# Patient Record
Sex: FEMALE | Race: WHITE | NOT HISPANIC OR LATINO | Employment: FULL TIME | URBAN - METROPOLITAN AREA
[De-identification: names, ages, dates, MRNs, and addresses within clinical notes are randomized per-mention and may not be internally consistent; named-entity substitution may affect disease eponyms.]

---

## 2017-09-28 ENCOUNTER — PROCEDURE VISIT (OUTPATIENT)
Dept: OBSTETRICS AND GYNECOLOGY | Facility: CLINIC | Age: 22
End: 2017-09-28
Payer: MEDICAID

## 2017-09-28 ENCOUNTER — INITIAL PRENATAL (OUTPATIENT)
Dept: OBSTETRICS AND GYNECOLOGY | Facility: CLINIC | Age: 22
End: 2017-09-28
Payer: MEDICAID

## 2017-09-28 VITALS — WEIGHT: 139.31 LBS | DIASTOLIC BLOOD PRESSURE: 67 MMHG | SYSTOLIC BLOOD PRESSURE: 104 MMHG

## 2017-09-28 DIAGNOSIS — O09.891 HISTORY OF PRETERM DELIVERY, CURRENTLY PREGNANT IN FIRST TRIMESTER: ICD-10-CM

## 2017-09-28 DIAGNOSIS — Z32.01 POSITIVE PREGNANCY TEST: Primary | ICD-10-CM

## 2017-09-28 DIAGNOSIS — Z32.01 POSITIVE PREGNANCY TEST: ICD-10-CM

## 2017-09-28 PROCEDURE — 76801 OB US < 14 WKS SINGLE FETUS: CPT | Mod: PBBFAC,PN | Performed by: OBSTETRICS & GYNECOLOGY

## 2017-09-28 PROCEDURE — 76801 OB US < 14 WKS SINGLE FETUS: CPT | Mod: 26,S$PBB,, | Performed by: OBSTETRICS & GYNECOLOGY

## 2017-09-28 PROCEDURE — 99999 PR PBB SHADOW E&M-NEW PATIENT-LVL III: CPT | Mod: PBBFAC,,, | Performed by: OBSTETRICS & GYNECOLOGY

## 2017-09-28 PROCEDURE — 99203 OFFICE O/P NEW LOW 30 MIN: CPT | Mod: PBBFAC,25,PN | Performed by: OBSTETRICS & GYNECOLOGY

## 2017-09-28 PROCEDURE — 99203 OFFICE O/P NEW LOW 30 MIN: CPT | Mod: TH,S$PBB,, | Performed by: OBSTETRICS & GYNECOLOGY

## 2017-09-28 NOTE — PROGRESS NOTES
Patient welcomed into collaborative MD/CNM practice!  PE WNL  UPT positive  No bleeding or cramping since LMP  Hx of previous  births x2 @ 33 and 35 weeks, spontaneous labor  Discussed benefits of BF, rooming / and skin to skin  US for dating today, 6w4d by LMP of 2017  Rx sent for PNV to preferred pharmacy  States is prone to headaches, but didn't know which medication was safe in pregnancy. Will take Tylenol, if not controlled will call office.   Experiencing mild nausea, denies need for medication @ this time. Will call if needed  GC/Chlamydia and urine culture next OV  RTC in 4 weeks  AAdithya LYNN

## 2017-09-29 ENCOUNTER — TELEPHONE (OUTPATIENT)
Dept: OBSTETRICS AND GYNECOLOGY | Facility: CLINIC | Age: 22
End: 2017-09-29

## 2017-09-29 NOTE — TELEPHONE ENCOUNTER
----- Message from Kay Messina sent at 9/28/2017  6:14 PM CDT -----  Contact: Sheron mccoy/[facility]   Sheron is calling in regards to a prenatal A-D that was prescribed for pt. Per Sheron looks like it can't be ordered. Calling to see if there is a different prenatal vitamin pt can get.    Sheron can be reached at [phone].    Thank you

## 2017-10-03 ENCOUNTER — TELEPHONE (OUTPATIENT)
Dept: OBSTETRICS AND GYNECOLOGY | Facility: CLINIC | Age: 22
End: 2017-10-03

## 2017-10-03 PROBLEM — O09.891 HISTORY OF PRETERM DELIVERY, CURRENTLY PREGNANT IN FIRST TRIMESTER: Status: ACTIVE | Noted: 2017-10-03

## 2017-10-03 NOTE — TELEPHONE ENCOUNTER
----- Message from Tae M Jasen sent at 10/3/2017 12:03 PM CDT -----  Contact: Lab Client Services  Hi my name is Tae I work in the lab Client Services. We had a problem with some lab work on this patient. If someone from your office could call us at [phone] or [phone] that would be great. Anyone in my department can help. Thank You

## 2017-10-12 ENCOUNTER — TELEPHONE (OUTPATIENT)
Dept: OBSTETRICS AND GYNECOLOGY | Facility: CLINIC | Age: 22
End: 2017-10-12

## 2017-10-12 NOTE — TELEPHONE ENCOUNTER
Pt called c/o cramping in pregnancy. I asked her if she was having any bleeding she stated that she wasn't. I asked her if her cramping increases along with bleeding to please go to the ED. She was advised to increase her water intake, try warm soaks in the tub, and tylenol as needed. DS

## 2017-10-23 ENCOUNTER — LAB VISIT (OUTPATIENT)
Dept: LAB | Facility: HOSPITAL | Age: 22
End: 2017-10-23
Attending: OBSTETRICS & GYNECOLOGY
Payer: MEDICAID

## 2017-10-23 DIAGNOSIS — Z32.01 POSITIVE PREGNANCY TEST: ICD-10-CM

## 2017-10-23 LAB
ABO + RH BLD: NORMAL
BASOPHILS # BLD AUTO: 0.03 K/UL
BASOPHILS NFR BLD: 0.5 %
BLD GP AB SCN CELLS X3 SERPL QL: NORMAL
DIFFERENTIAL METHOD: ABNORMAL
EOSINOPHIL # BLD AUTO: 0.1 K/UL
EOSINOPHIL NFR BLD: 1 %
ERYTHROCYTE [DISTWIDTH] IN BLOOD BY AUTOMATED COUNT: 13.3 %
HCT VFR BLD AUTO: 32.2 %
HGB BLD-MCNC: 10.3 G/DL
HGB S BLD QL SOLY: NEGATIVE
IMM GRANULOCYTES # BLD AUTO: 0.01 K/UL
IMM GRANULOCYTES NFR BLD AUTO: 0.2 %
LYMPHOCYTES # BLD AUTO: 1.4 K/UL
LYMPHOCYTES NFR BLD: 23.3 %
MCH RBC QN AUTO: 28.2 PG
MCHC RBC AUTO-ENTMCNC: 32 G/DL
MCV RBC AUTO: 88 FL
MONOCYTES # BLD AUTO: 0.3 K/UL
MONOCYTES NFR BLD: 5.3 %
NEUTROPHILS # BLD AUTO: 4.2 K/UL
NEUTROPHILS NFR BLD: 69.7 %
NRBC BLD-RTO: 0 /100 WBC
PLATELET # BLD AUTO: 274 K/UL
PMV BLD AUTO: 11 FL
RBC # BLD AUTO: 3.65 M/UL
WBC # BLD AUTO: 6.02 K/UL

## 2017-10-23 PROCEDURE — 86850 RBC ANTIBODY SCREEN: CPT

## 2017-10-23 PROCEDURE — 86762 RUBELLA ANTIBODY: CPT

## 2017-10-23 PROCEDURE — 86592 SYPHILIS TEST NON-TREP QUAL: CPT

## 2017-10-23 PROCEDURE — 85025 COMPLETE CBC W/AUTO DIFF WBC: CPT

## 2017-10-23 PROCEDURE — 86703 HIV-1/HIV-2 1 RESULT ANTBDY: CPT

## 2017-10-23 PROCEDURE — 86900 BLOOD TYPING SEROLOGIC ABO: CPT

## 2017-10-23 PROCEDURE — 36415 COLL VENOUS BLD VENIPUNCTURE: CPT | Mod: PO

## 2017-10-23 PROCEDURE — 85660 RBC SICKLE CELL TEST: CPT

## 2017-10-23 PROCEDURE — 87340 HEPATITIS B SURFACE AG IA: CPT

## 2017-10-24 LAB
HBV SURFACE AG SERPL QL IA: NEGATIVE
HIV 1+2 AB+HIV1 P24 AG SERPL QL IA: NEGATIVE
RPR SER QL: NORMAL
RUBV IGG SER-ACNC: <5 IU/ML
RUBV IGG SER-IMP: ABNORMAL

## 2017-10-26 ENCOUNTER — TELEPHONE (OUTPATIENT)
Dept: OBSTETRICS AND GYNECOLOGY | Facility: CLINIC | Age: 22
End: 2017-10-26

## 2017-10-26 ENCOUNTER — ROUTINE PRENATAL (OUTPATIENT)
Dept: OBSTETRICS AND GYNECOLOGY | Facility: CLINIC | Age: 22
End: 2017-10-26
Payer: MEDICAID

## 2017-10-26 VITALS — DIASTOLIC BLOOD PRESSURE: 76 MMHG | SYSTOLIC BLOOD PRESSURE: 118 MMHG | WEIGHT: 138.88 LBS

## 2017-10-26 DIAGNOSIS — O09.891 HISTORY OF PRETERM DELIVERY, CURRENTLY PREGNANT IN FIRST TRIMESTER: Primary | ICD-10-CM

## 2017-10-26 DIAGNOSIS — Z11.3 SCREENING FOR GONORRHEA: ICD-10-CM

## 2017-10-26 DIAGNOSIS — Z12.4 SCREENING FOR CERVICAL CANCER: ICD-10-CM

## 2017-10-26 PROCEDURE — 88141 CYTOPATH C/V INTERPRET: CPT | Mod: ,,, | Performed by: PATHOLOGY

## 2017-10-26 PROCEDURE — 87086 URINE CULTURE/COLONY COUNT: CPT

## 2017-10-26 PROCEDURE — 87624 HPV HI-RISK TYP POOLED RSLT: CPT

## 2017-10-26 PROCEDURE — 99213 OFFICE O/P EST LOW 20 MIN: CPT | Mod: PBBFAC,PN | Performed by: OBSTETRICS & GYNECOLOGY

## 2017-10-26 PROCEDURE — 88175 CYTOPATH C/V AUTO FLUID REDO: CPT | Performed by: PATHOLOGY

## 2017-10-26 PROCEDURE — 99213 OFFICE O/P EST LOW 20 MIN: CPT | Mod: TH,S$PBB,, | Performed by: OBSTETRICS & GYNECOLOGY

## 2017-10-26 PROCEDURE — 87591 N.GONORRHOEAE DNA AMP PROB: CPT

## 2017-10-26 PROCEDURE — 99999 PR PBB SHADOW E&M-EST. PATIENT-LVL III: CPT | Mod: PBBFAC,,, | Performed by: OBSTETRICS & GYNECOLOGY

## 2017-10-26 NOTE — PROGRESS NOTES
No further having contractions  Having problems getting used to pregnancy breathing  Pap/gc/ct today  ucx today  Reviewed edc, prenatal labs  Anatomy/cervical length sono at 18w  Offer quad screen at 16w

## 2017-10-26 NOTE — TELEPHONE ENCOUNTER
----- Message from Katie Ferris MD sent at 10/26/2017  2:53 PM CDT -----  Please ck status of domingo from [facility]

## 2017-10-27 ENCOUNTER — TELEPHONE (OUTPATIENT)
Dept: PHARMACY | Facility: CLINIC | Age: 22
End: 2017-10-27

## 2017-10-27 LAB
BACTERIA UR CULT: NORMAL
C TRACH DNA SPEC QL NAA+PROBE: NOT DETECTED
N GONORRHOEA DNA SPEC QL NAA+PROBE: NOT DETECTED

## 2017-10-27 RX ORDER — HYDROXYPROGESTERONE CAPROATE 250 MG/ML
250 INJECTION INTRAMUSCULAR
Qty: 5 ML | Refills: 4 | Status: SHIPPED | OUTPATIENT
Start: 2017-10-27 | End: 2018-03-10

## 2017-10-27 NOTE — TELEPHONE ENCOUNTER
LVM- Hello [facility] received a prescription for [place] and we will contact their insurance company to find out if the medication is covered. We will update patient of status as more information is received. feel free to give us a call with  any questions at [phone].

## 2017-11-01 ENCOUNTER — TELEPHONE (OUTPATIENT)
Dept: OBSTETRICS AND GYNECOLOGY | Facility: CLINIC | Age: 22
End: 2017-11-01

## 2017-11-01 ENCOUNTER — OFFICE VISIT (OUTPATIENT)
Dept: URGENT CARE | Facility: CLINIC | Age: 22
End: 2017-11-01
Payer: MEDICAID

## 2017-11-01 VITALS
TEMPERATURE: 98 F | SYSTOLIC BLOOD PRESSURE: 118 MMHG | WEIGHT: 138.88 LBS | DIASTOLIC BLOOD PRESSURE: 57 MMHG | HEIGHT: 62 IN | HEART RATE: 97 BPM | OXYGEN SATURATION: 100 % | BODY MASS INDEX: 25.55 KG/M2

## 2017-11-01 DIAGNOSIS — J02.9 PHARYNGITIS, UNSPECIFIED ETIOLOGY: ICD-10-CM

## 2017-11-01 DIAGNOSIS — R68.89 FLU-LIKE SYMPTOMS: Primary | ICD-10-CM

## 2017-11-01 LAB
CTP QC/QA: YES
CTP QC/QA: YES
FLUAV AG NPH QL: NEGATIVE
FLUBV AG NPH QL: NEGATIVE
S PYO RRNA THROAT QL PROBE: NEGATIVE

## 2017-11-01 PROCEDURE — 87880 STREP A ASSAY W/OPTIC: CPT | Mod: PBBFAC,PN | Performed by: NURSE PRACTITIONER

## 2017-11-01 PROCEDURE — 99214 OFFICE O/P EST MOD 30 MIN: CPT | Mod: PBBFAC,PN | Performed by: NURSE PRACTITIONER

## 2017-11-01 PROCEDURE — 87804 INFLUENZA ASSAY W/OPTIC: CPT | Mod: 59,PBBFAC,PN | Performed by: NURSE PRACTITIONER

## 2017-11-01 PROCEDURE — 87081 CULTURE SCREEN ONLY: CPT

## 2017-11-01 PROCEDURE — 99203 OFFICE O/P NEW LOW 30 MIN: CPT | Mod: S$PBB,,, | Performed by: NURSE PRACTITIONER

## 2017-11-01 PROCEDURE — 99999 PR PBB SHADOW E&M-EST. PATIENT-LVL IV: CPT | Mod: PBBFAC,,, | Performed by: NURSE PRACTITIONER

## 2017-11-01 RX ORDER — OSELTAMIVIR PHOSPHATE 75 MG/1
75 CAPSULE ORAL 2 TIMES DAILY
Qty: 10 CAPSULE | Refills: 0 | Status: SHIPPED | OUTPATIENT
Start: 2017-11-01 | End: 2017-11-06

## 2017-11-01 NOTE — PROGRESS NOTES
Subjective:       Patient ID: Mattie Bledsoe is a 22 y.o. female.    Chief Complaint: Fever; Sore Throat; and Generalized Body Aches    22 year old presents to Urgent Care with reports of flu-like symptoms with sore throat that started today. Denies any other problems or concerns at this time.       Influenza   This is a new problem. The current episode started today. The problem occurs constantly. The problem has been unchanged. Associated symptoms include a fever and a sore throat. Pertinent negatives include no abdominal pain, chest pain, chills, nausea, numbness, rash, vomiting or weakness. Associated symptoms comments: Body aches. Nothing aggravates the symptoms. She has tried nothing for the symptoms. The treatment provided no relief.     Review of Systems   Constitutional: Positive for fever. Negative for appetite change and chills.   HENT: Positive for sore throat. Negative for ear pain, sinus pressure and trouble swallowing.    Eyes: Negative for visual disturbance.   Respiratory: Negative for shortness of breath.    Cardiovascular: Negative for chest pain.   Gastrointestinal: Negative for abdominal pain, diarrhea, nausea and vomiting.   Endocrine: Negative for cold intolerance, polyphagia and polyuria.   Genitourinary: Negative for decreased urine volume and dysuria.   Musculoskeletal: Negative for back pain.   Skin: Negative for rash.   Allergic/Immunologic: Negative for environmental allergies and food allergies.   Neurological: Negative for dizziness, tremors, weakness and numbness.   Hematological: Does not bruise/bleed easily.   Psychiatric/Behavioral: Negative for confusion and hallucinations. The patient is not nervous/anxious and is not hyperactive.    All other systems reviewed and are negative.      Objective:     Physical Exam   Constitutional: She is oriented to person, place, and time. She appears well-developed and well-nourished.   HENT:   Head: Normocephalic and atraumatic.   Right Ear:  External ear normal.   Left Ear: External ear normal.   Nose: Mucosal edema present.   Mouth/Throat: Oropharynx is clear and moist.   Eyes: Conjunctivae and EOM are normal. Pupils are equal, round, and reactive to light.   Neck: Normal range of motion. Neck supple.   Cardiovascular: Normal rate, regular rhythm, normal heart sounds and intact distal pulses.    No murmur heard.  Pulmonary/Chest: Effort normal and breath sounds normal. She has no wheezes.   Abdominal: Soft. Bowel sounds are normal. There is no tenderness.   Musculoskeletal: Normal range of motion.   Neurological: She is alert and oriented to person, place, and time. She has normal reflexes.   Skin: Skin is warm and dry. No rash noted.   Psychiatric: She has a normal mood and affect. Her behavior is normal. Judgment and thought content normal.   Nursing note and vitals reviewed.  POCT Strep and FLU resulted negative  Recommended follow up within 3-5 days or sooner if needed with PCP.  Assessment:     1. Flu-like symptoms    2. Pharyngitis, unspecified etiology      Plan:   Mattie was seen today for fever, sore throat and generalized body aches.    Diagnoses and all orders for this visit:    Flu-like symptoms  -     POCT Influenza A/B    Pharyngitis, unspecified etiology  -     POCT Rapid Strep A  -     Strep A culture, throat    Other orders  -     oseltamivir (TAMIFLU) 75 MG capsule; Take 1 capsule (75 mg total) by mouth 2 (two) times daily.

## 2017-11-01 NOTE — PATIENT INSTRUCTIONS

## 2017-11-01 NOTE — LETTER
November 1, 2017      [facility]  [address]  [place] [address]  Phone: [phone]       Patient: Mattie Bledsoe   YOB: 1995  Date of Visit: 11/01/2017    To Whom It May Concern:    Andrez Bledsoe  was at [facility] on 11/01/2017. She may return to work/school on 11/4/2017 with no restrictions. If you have any questions or concerns, or if I can be of further assistance, please do not hesitate to contact me.    Sincerely,      ISIDRO Levy

## 2017-11-01 NOTE — TELEPHONE ENCOUNTER
----- Message from Jane Villanueva sent at 11/1/2017 11:48 AM CDT -----  12 wks, has cough & fever..what should she take....[phone] (home)

## 2017-11-06 LAB — BACTERIA THROAT CULT: NORMAL

## 2017-11-10 LAB
HPV16 AG SPEC QL: NEGATIVE
HPV16+18+H RISK 12 DNA CVX-IMP: POSITIVE
HPV18 DNA SPEC QL NAA+PROBE: NEGATIVE

## 2017-11-27 ENCOUNTER — ROUTINE PRENATAL (OUTPATIENT)
Dept: OBSTETRICS AND GYNECOLOGY | Facility: CLINIC | Age: 22
End: 2017-11-27
Payer: MEDICAID

## 2017-11-27 VITALS — SYSTOLIC BLOOD PRESSURE: 118 MMHG | BODY MASS INDEX: 25 KG/M2 | WEIGHT: 136.69 LBS | DIASTOLIC BLOOD PRESSURE: 67 MMHG

## 2017-11-27 DIAGNOSIS — Z28.39 RUBELLA NON-IMMUNE STATUS, ANTEPARTUM: ICD-10-CM

## 2017-11-27 DIAGNOSIS — B97.7 HIGH RISK HPV INFECTION: ICD-10-CM

## 2017-11-27 DIAGNOSIS — D64.9 ANEMIA, UNSPECIFIED TYPE: ICD-10-CM

## 2017-11-27 DIAGNOSIS — O09.899 HISTORY OF PRETERM DELIVERY, CURRENTLY PREGNANT: Primary | ICD-10-CM

## 2017-11-27 DIAGNOSIS — O09.899 RUBELLA NON-IMMUNE STATUS, ANTEPARTUM: ICD-10-CM

## 2017-11-27 DIAGNOSIS — Z3A.15 15 WEEKS GESTATION OF PREGNANCY: ICD-10-CM

## 2017-11-27 PROCEDURE — 99999 PR PBB SHADOW E&M-EST. PATIENT-LVL III: CPT | Mod: PBBFAC,,, | Performed by: OBSTETRICS & GYNECOLOGY

## 2017-11-27 PROCEDURE — 99213 OFFICE O/P EST LOW 20 MIN: CPT | Mod: PBBFAC,PN | Performed by: OBSTETRICS & GYNECOLOGY

## 2017-11-27 PROCEDURE — 99213 OFFICE O/P EST LOW 20 MIN: CPT | Mod: TH,S$PBB,, | Performed by: OBSTETRICS & GYNECOLOGY

## 2017-11-27 RX ORDER — HYDROXYPROGESTERONE CAPROATE 250 MG/ML
250 INJECTION INTRAMUSCULAR
Qty: 1 ML | Refills: 7 | Status: SHIPPED | OUTPATIENT
Start: 2017-11-27 | End: 2018-01-23 | Stop reason: SDUPTHER

## 2017-11-27 NOTE — PROGRESS NOTES
Loss of 1 1/2 #, but minimal n/v.  Reviewed all labs.  + Hi - risk HPV. Discussed findings, care. Reassured does not have cervical cancer at this time, but will need a colpo for evaluation. Will do after 18-20 wks.   Will do anatomy scan and cervical length in 4 wks.   Plan for start of Kiara 250 mg IM at next ov and then continue weekly.   Taking PNV daily and focusing on protein rich foods. Does not like red meat.  Recommended Floradix OTC as directed.   Quickening noted.  No spotting or cramping.   RTC 4 wks.

## 2017-11-28 NOTE — TELEPHONE ENCOUNTER
Left message for patient that I have put her on nurse visit schedule in [place] on Monday, December 4 and to call if this was not convenient [phone].

## 2017-11-28 NOTE — TELEPHONE ENCOUNTER
Left message for Mattie Rakan informing her [place] will be delivered to office this week with plan to start her weekly injections on Monday 12/3/2017.    If this day doesn't work told her to call me back to set up nurse visit on day she desires.  But for now- tentatively place her in nurse visit slot for Monday to start her domingo injections weekly.    Thanks.  She may be calling back to schedule another day for next week .

## 2017-12-04 ENCOUNTER — CLINICAL SUPPORT (OUTPATIENT)
Dept: OBSTETRICS AND GYNECOLOGY | Facility: CLINIC | Age: 22
End: 2017-12-04
Payer: MEDICAID

## 2017-12-04 VITALS — SYSTOLIC BLOOD PRESSURE: 95 MMHG | HEIGHT: 62 IN | DIASTOLIC BLOOD PRESSURE: 57 MMHG

## 2017-12-04 DIAGNOSIS — O09.899 HISTORY OF PRETERM DELIVERY, CURRENTLY PREGNANT: Primary | ICD-10-CM

## 2017-12-04 PROCEDURE — 99212 OFFICE O/P EST SF 10 MIN: CPT | Mod: PBBFAC,PN

## 2017-12-04 PROCEDURE — 99999 PR PBB SHADOW E&M-EST. PATIENT-LVL II: CPT | Mod: PBBFAC,,,

## 2017-12-04 RX ORDER — HYDROXYPROGESTERONE CAPROATE 250 MG/ML
250 INJECTION INTRAMUSCULAR
Status: COMPLETED | OUTPATIENT
Start: 2017-12-04 | End: 2017-12-04

## 2017-12-04 RX ADMIN — HYDROXYPROGESTERONE CAPROATE 250 MG: 250 INJECTION INTRAMUSCULAR at 01:12

## 2017-12-04 NOTE — PROGRESS NOTES
Per [place] protocol, gave pt. Kiara injection to right ventro gluteal area. Pt. advised to remain in the waiting area for 15 minutes to ensure no adverse reaction. Pt. given future depo dates. ssmith,lpn

## 2017-12-11 ENCOUNTER — TELEPHONE (OUTPATIENT)
Dept: OBSTETRICS AND GYNECOLOGY | Facility: CLINIC | Age: 22
End: 2017-12-11

## 2017-12-11 ENCOUNTER — CLINICAL SUPPORT (OUTPATIENT)
Dept: OBSTETRICS AND GYNECOLOGY | Facility: CLINIC | Age: 22
End: 2017-12-11
Payer: MEDICAID

## 2017-12-11 VITALS — SYSTOLIC BLOOD PRESSURE: 128 MMHG | DIASTOLIC BLOOD PRESSURE: 76 MMHG

## 2017-12-11 DIAGNOSIS — O09.899 HISTORY OF PRETERM DELIVERY, CURRENTLY PREGNANT: Primary | ICD-10-CM

## 2017-12-11 PROCEDURE — 99999 PR PBB SHADOW E&M-EST. PATIENT-LVL II: CPT | Mod: PBBFAC,,,

## 2017-12-11 PROCEDURE — 99212 OFFICE O/P EST SF 10 MIN: CPT | Mod: PBBFAC,PN

## 2017-12-11 RX ORDER — HYDROXYPROGESTERONE CAPROATE 250 MG/ML
250 INJECTION INTRAMUSCULAR
Status: DISCONTINUED | OUTPATIENT
Start: 2017-12-11 | End: 2018-04-12

## 2017-12-11 RX ADMIN — HYDROXYPROGESTERONE CAPROATE 250 MG: 250 INJECTION INTRAMUSCULAR at 10:12

## 2017-12-11 NOTE — TELEPHONE ENCOUNTER
Called pt. back, verified name & . Was discussing depo med she was receiving, educating on breakthrough bleeding and side effects and and while dispensing she said wait it's should be Kiara. Stopped giving med and asked Dr. vidal to come and talk to the pt. Dr. Vidal said to give the pt. her [place] injection as well. Apologized to pt. and gave her service recovery gift. Explained the situation to her.  I called Maggi Garduno (Supervisor) and filled out an SOS. tawana Chavez

## 2017-12-11 NOTE — PROGRESS NOTES
.Per [place]ara protocol, gave pt. [place] injection to right deltoid area. Pt. advised top remain in the waiting area for 15 minutes to ensure no adverse reaction. Pt. given future [place] dates. tawana kauffman

## 2017-12-12 ENCOUNTER — TELEPHONE (OUTPATIENT)
Dept: OBSTETRICS AND GYNECOLOGY | Facility: CLINIC | Age: 22
End: 2017-12-12

## 2017-12-12 NOTE — TELEPHONE ENCOUNTER
Please refer to encounter dated 10/30/17; where her domingo has been approved; and was to shipped to office 12/1/17

## 2017-12-12 NOTE — TELEPHONE ENCOUNTER
Updated pt. (Injection SOS incident) on 17. Pt. Came to the clinic for a [place] injection.  I called the pt. back from the waiting room area and verified her name and . I started conversing with the pt. About her receiving the medroxyprogesterone injectable 150mg/ml injection. On my nurses schedule, I had the pt. Appointment listed as a depo injection but it should have been a Kiara injection.  Dr. Ferris was not at the clinic at the time.  I went to the waiting area asked the pt. 2 times to wait a little longer but I felt bad because her baby was sick. I offered her to have her child seen at  Urgent Care but she declined.  I pended the medroxyprogesterone injectable 150mg/ml injection to Dr. Ferris and went into the room with the patient. I started talking to the pt. and asking her if she was aware of the side effects of depo injection like breakthrough bleeding.  She acknowledged that she understood and I was telling her that she has to return in three months for her future visit.  I was starting to inject the depo injection into her left deltoid area which she requested, and as I was started to put the syringe in she said, you know what, it's suppose to be Kiara.  I immediately pulled the needle out (which means the pt. received about 0.5mls of the medroxyprogesterone injectable 150mg/ml.  I immediately asked the pt. To please wait and I left out of the room and went to get Dr. Ferris.  I told Dr. Ferris that I had just given the wrong medication and she said she would go talk to the patient.  I went in after Dr. Ferris left out of the exam room and I apologized and gave the pt. A Kiara injection that Dr. Ferris put in the system.  I also gave the pt. service recovery gifts.   I then called Maggi Garduno (Supervisor) and she told me to put in an SOS and chart what happened.  I put in an SOS and charted what happened.  I met with Quality and Pharmacy and did some very thorough walk thru and discussed how  we can better our process.  tawana kauffman

## 2017-12-12 NOTE — PROGRESS NOTES
Pt advised that she had been given depo provera injection in error.  Advised patient that this will medication will not effect the pregnancy.  Pt advised she also still needs her domingo injection.  Pt voiced understanding.

## 2017-12-12 NOTE — TELEPHONE ENCOUNTER
Dr. Ferris can you please put in an order for Kiara for [facility].  I contacted the [facility] pharmacy [phone] and they have no record of the pt.'s order.  Thank you. tawana kauffman

## 2017-12-18 ENCOUNTER — CLINICAL SUPPORT (OUTPATIENT)
Dept: OBSTETRICS AND GYNECOLOGY | Facility: CLINIC | Age: 22
End: 2017-12-18
Payer: MEDICAID

## 2017-12-18 DIAGNOSIS — O09.899 HISTORY OF PRETERM DELIVERY, CURRENTLY PREGNANT: Primary | ICD-10-CM

## 2017-12-18 RX ADMIN — HYDROXYPROGESTERONE CAPROATE 250 MG: 250 INJECTION INTRAMUSCULAR at 09:12

## 2017-12-18 NOTE — PROGRESS NOTES
Per [place] protocol, gave pt. Kiara injection to right deltoid area. Pt. advised top remain in the waiting area for 15 minutes to ensure no adverse reaction. Pt. given future Kiara dates. tawana kauffman

## 2017-12-26 ENCOUNTER — ROUTINE PRENATAL (OUTPATIENT)
Dept: OBSTETRICS AND GYNECOLOGY | Facility: CLINIC | Age: 22
End: 2017-12-26
Payer: MEDICAID

## 2017-12-26 VITALS
WEIGHT: 139.25 LBS | BODY MASS INDEX: 25.46 KG/M2 | SYSTOLIC BLOOD PRESSURE: 107 MMHG | DIASTOLIC BLOOD PRESSURE: 66 MMHG

## 2017-12-26 DIAGNOSIS — Z87.51 HISTORY OF PRETERM LABOR: ICD-10-CM

## 2017-12-26 DIAGNOSIS — Z3A.19 19 WEEKS GESTATION OF PREGNANCY: Primary | ICD-10-CM

## 2017-12-26 PROCEDURE — 99213 OFFICE O/P EST LOW 20 MIN: CPT | Mod: PBBFAC,PN | Performed by: OBSTETRICS & GYNECOLOGY

## 2017-12-26 PROCEDURE — 99212 OFFICE O/P EST SF 10 MIN: CPT | Mod: TH,S$PBB,, | Performed by: OBSTETRICS & GYNECOLOGY

## 2017-12-26 PROCEDURE — 99999 PR PBB SHADOW E&M-EST. PATIENT-LVL III: CPT | Mod: PBBFAC,,, | Performed by: OBSTETRICS & GYNECOLOGY

## 2017-12-26 RX ADMIN — HYDROXYPROGESTERONE CAPROATE 250 MG: 250 INJECTION INTRAMUSCULAR at 03:12

## 2017-12-27 NOTE — PROGRESS NOTES
Weekly Kiara continues.  Much quickening noted.   Good fh/fm.   Common discomforts of pregnancy only.   Colpo needs to be scheduled.  Anatomy scan scheduled for Thursday.   Eating iron rich foods, not taking oral iron therapy.   Check cbc with next ov.   RTC 3 wks.

## 2018-01-02 ENCOUNTER — CLINICAL SUPPORT (OUTPATIENT)
Dept: OBSTETRICS AND GYNECOLOGY | Facility: CLINIC | Age: 23
End: 2018-01-02
Payer: MEDICAID

## 2018-01-02 DIAGNOSIS — Z87.51 HISTORY OF PRETERM LABOR: Primary | ICD-10-CM

## 2018-01-02 PROCEDURE — 99999 PR PBB SHADOW E&M-EST. PATIENT-LVL I: CPT | Mod: PBBFAC,,,

## 2018-01-02 PROCEDURE — 99211 OFF/OP EST MAY X REQ PHY/QHP: CPT | Mod: PBBFAC,PN,25

## 2018-01-02 PROCEDURE — 96372 THER/PROPH/DIAG INJ SC/IM: CPT | Mod: PBBFAC,PN

## 2018-01-02 RX ORDER — HYDROXYPROGESTERONE CAPROATE 250 MG/ML
250 INJECTION INTRAMUSCULAR
Status: COMPLETED | OUTPATIENT
Start: 2018-01-02 | End: 2018-01-02

## 2018-01-02 RX ADMIN — HYDROXYPROGESTERONE CAPROATE 250 MG: 250 INJECTION INTRAMUSCULAR at 10:01

## 2018-01-02 NOTE — PROGRESS NOTES
Per [place] protocol, gave pt. Kiara injection to right deltoid area. Pt. advised to remain in the waiting area for 15 minutes to ensure no adverse reaction. Pt. given future [place] dates. tawana kauffman

## 2018-01-08 ENCOUNTER — CLINICAL SUPPORT (OUTPATIENT)
Dept: OBSTETRICS AND GYNECOLOGY | Facility: CLINIC | Age: 23
End: 2018-01-08
Payer: MEDICAID

## 2018-01-08 ENCOUNTER — PROCEDURE VISIT (OUTPATIENT)
Dept: OBSTETRICS AND GYNECOLOGY | Facility: CLINIC | Age: 23
End: 2018-01-08
Payer: MEDICAID

## 2018-01-08 DIAGNOSIS — O09.899 HISTORY OF PRETERM DELIVERY, CURRENTLY PREGNANT: Primary | ICD-10-CM

## 2018-01-08 DIAGNOSIS — Z36.3 ENCOUNTER FOR ANTENATAL SCREENING FOR MALFORMATION USING ULTRASOUND: ICD-10-CM

## 2018-01-08 DIAGNOSIS — O09.899 HISTORY OF PRETERM DELIVERY, CURRENTLY PREGNANT: ICD-10-CM

## 2018-01-08 PROCEDURE — 96372 THER/PROPH/DIAG INJ SC/IM: CPT | Mod: PBBFAC,PN

## 2018-01-08 PROCEDURE — 76817 TRANSVAGINAL US OBSTETRIC: CPT | Mod: 52,PBBFAC,PN | Performed by: OBSTETRICS & GYNECOLOGY

## 2018-01-08 PROCEDURE — 99999 PR PBB SHADOW E&M-EST. PATIENT-LVL I: CPT | Mod: PBBFAC,,,

## 2018-01-08 PROCEDURE — 99211 OFF/OP EST MAY X REQ PHY/QHP: CPT | Mod: PBBFAC,PN

## 2018-01-08 PROCEDURE — 76805 OB US >/= 14 WKS SNGL FETUS: CPT | Mod: 26,S$PBB,, | Performed by: OBSTETRICS & GYNECOLOGY

## 2018-01-08 PROCEDURE — 76805 OB US >/= 14 WKS SNGL FETUS: CPT | Mod: PBBFAC,PN | Performed by: OBSTETRICS & GYNECOLOGY

## 2018-01-08 RX ORDER — HYDROXYPROGESTERONE CAPROATE 250 MG/ML
250 INJECTION INTRAMUSCULAR
Status: COMPLETED | OUTPATIENT
Start: 2018-01-09 | End: 2018-01-15

## 2018-01-08 RX ADMIN — HYDROXYPROGESTERONE CAPROATE 250 MG: 250 INJECTION INTRAMUSCULAR at 09:01

## 2018-01-08 NOTE — PROGRESS NOTES
Per [place] protocol, gave pt. Kiara injection to right deltoid area. Pt. advised top remain in the waiting area for 15 minutes to ensure no adverse reaction. Pt. given future Kiara dates. tawana kauffman

## 2018-01-15 ENCOUNTER — CLINICAL SUPPORT (OUTPATIENT)
Dept: OBSTETRICS AND GYNECOLOGY | Facility: CLINIC | Age: 23
End: 2018-01-15
Payer: MEDICAID

## 2018-01-15 DIAGNOSIS — Z87.51 HISTORY OF PRETERM LABOR: Primary | ICD-10-CM

## 2018-01-15 PROCEDURE — 99211 OFF/OP EST MAY X REQ PHY/QHP: CPT | Mod: PBBFAC,PN,25

## 2018-01-15 PROCEDURE — 99999 PR PBB SHADOW E&M-EST. PATIENT-LVL I: CPT | Mod: PBBFAC,,,

## 2018-01-15 PROCEDURE — 96372 THER/PROPH/DIAG INJ SC/IM: CPT | Mod: PBBFAC,PN

## 2018-01-15 RX ORDER — HYDROXYPROGESTERONE CAPROATE 250 MG/ML
250 INJECTION INTRAMUSCULAR
Status: COMPLETED | OUTPATIENT
Start: 2018-01-16 | End: 2018-01-23

## 2018-01-15 RX ADMIN — HYDROXYPROGESTERONE CAPROATE 250 MG: 250 INJECTION INTRAMUSCULAR at 04:01

## 2018-01-15 NOTE — PROGRESS NOTES
Per [place] protocol, gave pt. Kiara injection to right deltoid area. Pt. advised to remain in the waiting area for 15 minutes to ensure no adverse reaction. Pt. given future [place] dates. tawana kauffman

## 2018-01-23 ENCOUNTER — LAB VISIT (OUTPATIENT)
Dept: LAB | Facility: HOSPITAL | Age: 23
End: 2018-01-23
Attending: OBSTETRICS & GYNECOLOGY
Payer: MEDICAID

## 2018-01-23 ENCOUNTER — ROUTINE PRENATAL (OUTPATIENT)
Dept: OBSTETRICS AND GYNECOLOGY | Facility: CLINIC | Age: 23
End: 2018-01-23
Payer: MEDICAID

## 2018-01-23 VITALS — SYSTOLIC BLOOD PRESSURE: 110 MMHG | DIASTOLIC BLOOD PRESSURE: 70 MMHG | WEIGHT: 147.63 LBS | BODY MASS INDEX: 27 KG/M2

## 2018-01-23 DIAGNOSIS — D64.9 ANEMIA, UNSPECIFIED TYPE: ICD-10-CM

## 2018-01-23 DIAGNOSIS — O26.872 CERVICAL SHORTENING IN SECOND TRIMESTER: ICD-10-CM

## 2018-01-23 DIAGNOSIS — Z3A.23 23 WEEKS GESTATION OF PREGNANCY: Primary | ICD-10-CM

## 2018-01-23 DIAGNOSIS — B97.7 HIGH RISK HPV INFECTION: ICD-10-CM

## 2018-01-23 LAB
BASOPHILS # BLD AUTO: 0.05 K/UL
BASOPHILS NFR BLD: 0.5 %
DIFFERENTIAL METHOD: ABNORMAL
EOSINOPHIL # BLD AUTO: 0.3 K/UL
EOSINOPHIL NFR BLD: 2.9 %
ERYTHROCYTE [DISTWIDTH] IN BLOOD BY AUTOMATED COUNT: 13.6 %
HCT VFR BLD AUTO: 29.6 %
HGB BLD-MCNC: 9.5 G/DL
IMM GRANULOCYTES # BLD AUTO: 0.03 K/UL
IMM GRANULOCYTES NFR BLD AUTO: 0.3 %
LYMPHOCYTES # BLD AUTO: 1.9 K/UL
LYMPHOCYTES NFR BLD: 19.2 %
MCH RBC QN AUTO: 29.2 PG
MCHC RBC AUTO-ENTMCNC: 32.1 G/DL
MCV RBC AUTO: 91 FL
MONOCYTES # BLD AUTO: 0.7 K/UL
MONOCYTES NFR BLD: 6.8 %
NEUTROPHILS # BLD AUTO: 6.8 K/UL
NEUTROPHILS NFR BLD: 70.3 %
NRBC BLD-RTO: 0 /100 WBC
PLATELET # BLD AUTO: 228 K/UL
PMV BLD AUTO: 10.6 FL
RBC # BLD AUTO: 3.25 M/UL
WBC # BLD AUTO: 9.7 K/UL

## 2018-01-23 PROCEDURE — 85025 COMPLETE CBC W/AUTO DIFF WBC: CPT

## 2018-01-23 PROCEDURE — 99213 OFFICE O/P EST LOW 20 MIN: CPT | Mod: PBBFAC,TH,PN | Performed by: OBSTETRICS & GYNECOLOGY

## 2018-01-23 PROCEDURE — 99212 OFFICE O/P EST SF 10 MIN: CPT | Mod: TH,S$PBB,, | Performed by: OBSTETRICS & GYNECOLOGY

## 2018-01-23 PROCEDURE — 99999 PR PBB SHADOW E&M-EST. PATIENT-LVL III: CPT | Mod: PBBFAC,,, | Performed by: OBSTETRICS & GYNECOLOGY

## 2018-01-23 PROCEDURE — 36415 COLL VENOUS BLD VENIPUNCTURE: CPT | Mod: PO

## 2018-01-23 RX ADMIN — HYDROXYPROGESTERONE CAPROATE 250 MG: 250 INJECTION INTRAMUSCULAR at 09:01

## 2018-01-23 NOTE — PROGRESS NOTES
Per Mirena protocol, gave pt. Mirena injection to left deltoid area. Pt. advised top remain in the waiting area for 15 minutes to ensure no adverse reaction. Pt. given future Mirena dates. tawana kauffman

## 2018-01-23 NOTE — PROGRESS NOTES
"Continues weekly [place].  Has some occasional tightening " a couple of times a week".  Baby active with good fundal height.   Wt gain appropriate.  Taking iron off and on. Understands anemia can contribute to increased risk for anemia. Take as directed.  Will check CBC today and adjust dose if needed.  No ve indicated.   ASCUS Pap- High risk HPV +. Consulted MD re need for Colpo now or pp in light of history. Waiting till PP  No complaints.   RTC for OV 2 wks., weekly for [place].     "

## 2018-01-25 ENCOUNTER — TELEPHONE (OUTPATIENT)
Dept: OBSTETRICS AND GYNECOLOGY | Facility: CLINIC | Age: 23
End: 2018-01-25

## 2018-01-25 ENCOUNTER — PATIENT MESSAGE (OUTPATIENT)
Dept: OBSTETRICS AND GYNECOLOGY | Facility: CLINIC | Age: 23
End: 2018-01-25

## 2018-01-25 DIAGNOSIS — D64.9 ANEMIA, UNSPECIFIED TYPE: Primary | ICD-10-CM

## 2018-01-25 RX ORDER — FERROUS SULFATE 325(65) MG
325 TABLET ORAL 2 TIMES DAILY
Qty: 90 TABLET | Refills: 2 | Status: SHIPPED | OUTPATIENT
Start: 2018-01-25 | End: 2018-05-30

## 2018-01-25 NOTE — TELEPHONE ENCOUNTER
----- Message from Tiffanie Jim sent at 1/25/2018 12:01 PM CST -----  Contact: pt  Calling to reschedule nurse visit for injection and please advise [phone] (home)

## 2018-01-25 NOTE — TELEPHONE ENCOUNTER
----- Message from Nikolay Anguiano sent at 1/25/2018  9:40 AM CST -----  Contact: Pt   Pt called to schedule her nurse visit to Monday instead of Tuesday of next week if possible..[phone] (home)

## 2018-01-29 ENCOUNTER — CLINICAL SUPPORT (OUTPATIENT)
Dept: OBSTETRICS AND GYNECOLOGY | Facility: CLINIC | Age: 23
End: 2018-01-29
Payer: MEDICAID

## 2018-01-29 DIAGNOSIS — O09.899 HISTORY OF PRETERM DELIVERY, CURRENTLY PREGNANT: Primary | ICD-10-CM

## 2018-01-29 RX ADMIN — HYDROXYPROGESTERONE CAPROATE 250 MG: 250 INJECTION INTRAMUSCULAR at 09:01

## 2018-01-29 NOTE — PROGRESS NOTES
Per [place] protocol, gave pt. Kiara injection to left deltoid area. Pt. advised top remain in the waiting area for 15 minutes to ensure no adverse reaction. Pt. given future [place] dates. tawana kauffman

## 2018-02-05 ENCOUNTER — CLINICAL SUPPORT (OUTPATIENT)
Dept: OBSTETRICS AND GYNECOLOGY | Facility: CLINIC | Age: 23
End: 2018-02-05
Payer: MEDICAID

## 2018-02-05 DIAGNOSIS — O09.899 HISTORY OF PRETERM DELIVERY, CURRENTLY PREGNANT: Primary | ICD-10-CM

## 2018-02-05 PROCEDURE — 96372 THER/PROPH/DIAG INJ SC/IM: CPT | Mod: PBBFAC,PN

## 2018-02-05 RX ADMIN — HYDROXYPROGESTERONE CAPROATE 250 MG: 250 INJECTION INTRAMUSCULAR at 09:02

## 2018-02-05 NOTE — PROGRESS NOTES
Per [place] protocol, gave pt. Kiara injection to right deltoidarea. Pt. advised to remain in the waiting area for 15 minutes to ensure no adverse reaction. Pt. given future [place] dates. tawana kauffman

## 2018-02-06 ENCOUNTER — TELEPHONE (OUTPATIENT)
Dept: PHARMACY | Facility: CLINIC | Age: 23
End: 2018-02-06

## 2018-02-08 ENCOUNTER — ROUTINE PRENATAL (OUTPATIENT)
Dept: OBSTETRICS AND GYNECOLOGY | Facility: CLINIC | Age: 23
End: 2018-02-08
Payer: MEDICAID

## 2018-02-08 VITALS
DIASTOLIC BLOOD PRESSURE: 61 MMHG | SYSTOLIC BLOOD PRESSURE: 107 MMHG | BODY MASS INDEX: 27.26 KG/M2 | WEIGHT: 149.06 LBS

## 2018-02-08 DIAGNOSIS — Z3A.25 25 WEEKS GESTATION OF PREGNANCY: Primary | ICD-10-CM

## 2018-02-08 DIAGNOSIS — Z87.51 HISTORY OF PRETERM LABOR: ICD-10-CM

## 2018-02-08 DIAGNOSIS — O47.02 THREATENED PREMATURE LABOR IN SECOND TRIMESTER: ICD-10-CM

## 2018-02-08 LAB — FIBRONECTIN FETAL SPEC QL: NEGATIVE

## 2018-02-08 PROCEDURE — 99213 OFFICE O/P EST LOW 20 MIN: CPT | Mod: TH,S$PBB,, | Performed by: OBSTETRICS & GYNECOLOGY

## 2018-02-08 PROCEDURE — 3008F BODY MASS INDEX DOCD: CPT | Mod: ,,, | Performed by: OBSTETRICS & GYNECOLOGY

## 2018-02-08 PROCEDURE — 99999 PR PBB SHADOW E&M-EST. PATIENT-LVL III: CPT | Mod: PBBFAC,,, | Performed by: OBSTETRICS & GYNECOLOGY

## 2018-02-08 PROCEDURE — 99213 OFFICE O/P EST LOW 20 MIN: CPT | Mod: PBBFAC,TH,PN | Performed by: OBSTETRICS & GYNECOLOGY

## 2018-02-08 PROCEDURE — 82731 ASSAY OF FETAL FIBRONECTIN: CPT

## 2018-02-08 NOTE — LETTER
[facility]  [address] [place] [address] [address]  [place] [address]  Phone: [phone]       2018    To Whom It May Concern,    Please be advised that Ms. Bledsoe is currently under our care for her pregnancy.    She is 25 weeks 4 days gestation and experiencing significant cramping and dizziness.  She is receiving weekly injections to prevent her very real risk of  labor and delivery and for this reason she was seen in the clinic today.    I have asked her to be at rest for the next 2 weeks till she will be re-evaluated again as to whether it is appropriate for her to return to work.    She has numerous tests pending and we are sincerely hoping that she can carry her pregnancy closer to term and avoid a  birth.    She will seen again on 2018 for follow up and make a determination at that time if she will be able to work.    Kindest regards,    Felicita Luna, MSN, APRN-CNM

## 2018-02-09 ENCOUNTER — TELEPHONE (OUTPATIENT)
Dept: OBSTETRICS AND GYNECOLOGY | Facility: CLINIC | Age: 23
End: 2018-02-09

## 2018-02-09 NOTE — TELEPHONE ENCOUNTER
Pt. [place] is due to be delivered to the [place] location on 2/9/18. Mattie called and paid her $3.00 copay. tawana Chavez Ms. Mattie, we are trying to reach you to see if you are ready for your [place] refill.     Your Copay is $ 3     We need to know     1. Have you started any new meds or developed any side effects?   2. Have there been any dose changes to this medication?       Please also inform us of any updates to your medical history.     Thank you.   [facility]   [phone]

## 2018-02-12 ENCOUNTER — CLINICAL SUPPORT (OUTPATIENT)
Dept: OBSTETRICS AND GYNECOLOGY | Facility: CLINIC | Age: 23
End: 2018-02-12
Payer: MEDICAID

## 2018-02-12 DIAGNOSIS — Z87.51 HISTORY OF PRETERM LABOR: Primary | ICD-10-CM

## 2018-02-12 PROCEDURE — 96372 THER/PROPH/DIAG INJ SC/IM: CPT | Mod: PBBFAC,PN

## 2018-02-12 RX ADMIN — HYDROXYPROGESTERONE CAPROATE 250 MG: 250 INJECTION INTRAMUSCULAR at 09:02

## 2018-02-12 NOTE — PROGRESS NOTES
Per [place] protocol, gave pt. Kiara injection to left deltoid area. Pt. advised to remain in the waiting area for 15 minutes to ensure no adverse reaction. Pt. given future [place] dates. tawana kauffman

## 2018-02-19 ENCOUNTER — CLINICAL SUPPORT (OUTPATIENT)
Dept: OBSTETRICS AND GYNECOLOGY | Facility: CLINIC | Age: 23
End: 2018-02-19
Payer: MEDICAID

## 2018-02-19 DIAGNOSIS — Z87.51 HISTORY OF PRETERM LABOR: Primary | ICD-10-CM

## 2018-02-19 PROCEDURE — 96372 THER/PROPH/DIAG INJ SC/IM: CPT | Mod: PBBFAC,PN

## 2018-02-19 PROCEDURE — 99999 PR PBB SHADOW E&M-EST. PATIENT-LVL II: CPT | Mod: PBBFAC,,,

## 2018-02-19 PROCEDURE — 99212 OFFICE O/P EST SF 10 MIN: CPT | Mod: PBBFAC,PN

## 2018-02-19 RX ADMIN — HYDROXYPROGESTERONE CAPROATE 250 MG: 250 INJECTION INTRAMUSCULAR at 09:02

## 2018-02-19 NOTE — PROGRESS NOTES
Per [place] protocol, gave pt. Kiara injection to right deltoid area. Pt. advised to remain in the waiting area for 15 minutes to ensure no adverse reaction. Pt. given future [place] dates. tawana kauffman

## 2018-02-21 ENCOUNTER — TELEPHONE (OUTPATIENT)
Dept: OBSTETRICS AND GYNECOLOGY | Facility: CLINIC | Age: 23
End: 2018-02-21

## 2018-02-21 NOTE — TELEPHONE ENCOUNTER
Spoke to patient and she is scheduled to see Dr. Ferris Friday 2/23.  Patient verbalized understanding.

## 2018-02-21 NOTE — TELEPHONE ENCOUNTER
----- Message from Nikolay Anugiano sent at 2/21/2018  4:15 PM CST -----  Contact: Pt   Pt called to discuss she was told she needed to be seen 2 weeks from the last visit which 2 weeks would be tomorrow pt states she has not heard anything regarding the appointment..[phone] (home)

## 2018-02-23 ENCOUNTER — ROUTINE PRENATAL (OUTPATIENT)
Dept: OBSTETRICS AND GYNECOLOGY | Facility: CLINIC | Age: 23
End: 2018-02-23
Payer: MEDICAID

## 2018-02-23 VITALS — BODY MASS INDEX: 28 KG/M2 | SYSTOLIC BLOOD PRESSURE: 109 MMHG | DIASTOLIC BLOOD PRESSURE: 65 MMHG | WEIGHT: 153.13 LBS

## 2018-02-23 DIAGNOSIS — Z87.51 HISTORY OF PRETERM LABOR: Primary | ICD-10-CM

## 2018-02-23 DIAGNOSIS — Z34.82 PRENATAL CARE, SUBSEQUENT PREGNANCY, SECOND TRIMESTER: ICD-10-CM

## 2018-02-23 DIAGNOSIS — B96.89 BACTERIAL VAGINOSIS: ICD-10-CM

## 2018-02-23 DIAGNOSIS — N76.0 BACTERIAL VAGINOSIS: ICD-10-CM

## 2018-02-23 PROCEDURE — 99999 PR PBB SHADOW E&M-EST. PATIENT-LVL III: CPT | Mod: PBBFAC,,, | Performed by: OBSTETRICS & GYNECOLOGY

## 2018-02-23 PROCEDURE — 99213 OFFICE O/P EST LOW 20 MIN: CPT | Mod: PBBFAC,TH,PN | Performed by: OBSTETRICS & GYNECOLOGY

## 2018-02-23 PROCEDURE — 99213 OFFICE O/P EST LOW 20 MIN: CPT | Mod: TH,S$PBB,, | Performed by: OBSTETRICS & GYNECOLOGY

## 2018-02-23 PROCEDURE — 3008F BODY MASS INDEX DOCD: CPT | Mod: ,,, | Performed by: OBSTETRICS & GYNECOLOGY

## 2018-02-23 PROCEDURE — 87480 CANDIDA DNA DIR PROBE: CPT

## 2018-02-23 NOTE — LETTER
2018      [facility]  [address] [place] [address] [address]  [place] [address]  Phone: [phone]       Patient: Mattie Bledsoe   YOB: 1995  Date of Visit: 2018    To Whom It May Concern:    Andrez Bledsoe  was at [facility] on 2018.  She will be off work until 2018.  She is off due to  contractions (history of  delivery times two).  If you have any questions or concerns, or if I can be of further assistance, please do not hesitate to contact me.    Sincerely,      Katie Ferris MD

## 2018-02-23 NOTE — PROGRESS NOTES
Pt currently not working  Feels still with uterine contractions--4-5/d  Feels they increase if more active at home with children; but can even occur with sitting  Feels they have subsided a lot  In frequency and intensity since being off work  Continues with domingo weekly  Work restriction extended until 36w; continue limited activity  Affirm today  Glucola/cbc today

## 2018-02-24 LAB
CANDIDA RRNA VAG QL PROBE: NEGATIVE
G VAGINALIS RRNA GENITAL QL PROBE: POSITIVE
T VAGINALIS RRNA GENITAL QL PROBE: NEGATIVE

## 2018-02-26 ENCOUNTER — CLINICAL SUPPORT (OUTPATIENT)
Dept: OBSTETRICS AND GYNECOLOGY | Facility: CLINIC | Age: 23
End: 2018-02-26
Payer: MEDICAID

## 2018-02-26 ENCOUNTER — LAB VISIT (OUTPATIENT)
Dept: LAB | Facility: HOSPITAL | Age: 23
End: 2018-02-26
Attending: OBSTETRICS & GYNECOLOGY
Payer: MEDICAID

## 2018-02-26 DIAGNOSIS — Z34.82 PRENATAL CARE, SUBSEQUENT PREGNANCY, SECOND TRIMESTER: ICD-10-CM

## 2018-02-26 DIAGNOSIS — Z87.51 HISTORY OF PRETERM LABOR: ICD-10-CM

## 2018-02-26 LAB
ERYTHROCYTE [DISTWIDTH] IN BLOOD BY AUTOMATED COUNT: 13.3 %
GLUCOSE SERPL-MCNC: 72 MG/DL
HCT VFR BLD AUTO: 29.9 %
HGB BLD-MCNC: 9.5 G/DL
MCH RBC QN AUTO: 28.3 PG
MCHC RBC AUTO-ENTMCNC: 31.8 G/DL
MCV RBC AUTO: 89 FL
PLATELET # BLD AUTO: 224 K/UL
PMV BLD AUTO: 10.8 FL
RBC # BLD AUTO: 3.36 M/UL
WBC # BLD AUTO: 10.18 K/UL

## 2018-02-26 PROCEDURE — 85027 COMPLETE CBC AUTOMATED: CPT

## 2018-02-26 PROCEDURE — 96372 THER/PROPH/DIAG INJ SC/IM: CPT | Mod: PBBFAC,PN

## 2018-02-26 PROCEDURE — 36415 COLL VENOUS BLD VENIPUNCTURE: CPT | Mod: PO

## 2018-02-26 PROCEDURE — 82950 GLUCOSE TEST: CPT

## 2018-02-26 RX ADMIN — HYDROXYPROGESTERONE CAPROATE 250 MG: 250 INJECTION INTRAMUSCULAR at 09:02

## 2018-02-26 NOTE — PROGRESS NOTES
Per [place] protocol, gave pt. Kiara injection to right deltoid area. Pt. advised to remain in the waiting area for 15 minutes to ensure no adverse reaction. Pt. given future [place] dates. tawana kauffman

## 2018-02-28 ENCOUNTER — PATIENT MESSAGE (OUTPATIENT)
Dept: OBSTETRICS AND GYNECOLOGY | Facility: CLINIC | Age: 23
End: 2018-02-28

## 2018-03-01 ENCOUNTER — PATIENT MESSAGE (OUTPATIENT)
Dept: OBSTETRICS AND GYNECOLOGY | Facility: CLINIC | Age: 23
End: 2018-03-01

## 2018-03-01 ENCOUNTER — PATIENT MESSAGE (OUTPATIENT)
Dept: OBSTETRICS AND GYNECOLOGY | Facility: HOSPITAL | Age: 23
End: 2018-03-01

## 2018-03-01 RX ORDER — METRONIDAZOLE 7.5 MG/G
1 GEL VAGINAL DAILY
Qty: 5 APPLICATOR | Refills: 0 | Status: SHIPPED | OUTPATIENT
Start: 2018-03-01 | End: 2018-03-06

## 2018-03-05 ENCOUNTER — TELEPHONE (OUTPATIENT)
Dept: PHARMACY | Facility: CLINIC | Age: 23
End: 2018-03-05

## 2018-03-09 ENCOUNTER — ROUTINE PRENATAL (OUTPATIENT)
Dept: OBSTETRICS AND GYNECOLOGY | Facility: CLINIC | Age: 23
End: 2018-03-09
Payer: MEDICAID

## 2018-03-09 VITALS
SYSTOLIC BLOOD PRESSURE: 111 MMHG | WEIGHT: 158.75 LBS | DIASTOLIC BLOOD PRESSURE: 62 MMHG | BODY MASS INDEX: 29.03 KG/M2

## 2018-03-09 DIAGNOSIS — Z87.51 HISTORY OF PRETERM LABOR: Primary | ICD-10-CM

## 2018-03-09 DIAGNOSIS — O09.891 HISTORY OF PRETERM DELIVERY, CURRENTLY PREGNANT IN FIRST TRIMESTER: ICD-10-CM

## 2018-03-09 DIAGNOSIS — Z34.83 ENCOUNTER FOR SUPERVISION OF OTHER NORMAL PREGNANCY IN THIRD TRIMESTER: ICD-10-CM

## 2018-03-09 PROCEDURE — 99213 OFFICE O/P EST LOW 20 MIN: CPT | Mod: PBBFAC,TH,PN | Performed by: OBSTETRICS & GYNECOLOGY

## 2018-03-09 PROCEDURE — 99213 OFFICE O/P EST LOW 20 MIN: CPT | Mod: TH,S$PBB,, | Performed by: OBSTETRICS & GYNECOLOGY

## 2018-03-09 PROCEDURE — 99999 PR PBB SHADOW E&M-EST. PATIENT-LVL III: CPT | Mod: PBBFAC,,, | Performed by: OBSTETRICS & GYNECOLOGY

## 2018-03-09 RX ORDER — HYDROXYPROGESTERONE CAPROATE 250 MG/ML
250 INJECTION INTRAMUSCULAR
Status: DISCONTINUED | OUTPATIENT
Start: 2018-03-09 | End: 2018-04-12

## 2018-03-09 RX ADMIN — HYDROXYPROGESTERONE CAPROATE 250 MG: 250 INJECTION INTRAMUSCULAR at 11:03

## 2018-03-09 NOTE — PROGRESS NOTES
Reports 4-5 contractions/d; much better  Trying to limit activity at home  Still off work; plans to return 12 wks after delivery  glucola wnl; hgb 9.5; has liquid iron--but forgets to take  Reviewed contraception options--considering tl--reviwed mirena, nexplanon  Sign tl consents next visit  Continue weekly domingo until 35 wks

## 2018-03-10 ENCOUNTER — PATIENT MESSAGE (OUTPATIENT)
Dept: OBSTETRICS AND GYNECOLOGY | Facility: CLINIC | Age: 23
End: 2018-03-10

## 2018-03-10 DIAGNOSIS — B37.31 YEAST VAGINITIS: Primary | ICD-10-CM

## 2018-03-12 RX ORDER — FLUCONAZOLE 150 MG/1
150 TABLET ORAL DAILY
Qty: 1 TABLET | Refills: 0 | Status: SHIPPED | OUTPATIENT
Start: 2018-03-12 | End: 2018-03-13

## 2018-03-15 ENCOUNTER — CLINICAL SUPPORT (OUTPATIENT)
Dept: OBSTETRICS AND GYNECOLOGY | Facility: CLINIC | Age: 23
End: 2018-03-15
Payer: MEDICAID

## 2018-03-15 PROCEDURE — 96372 THER/PROPH/DIAG INJ SC/IM: CPT | Mod: PBBFAC,PN

## 2018-03-15 RX ADMIN — HYDROXYPROGESTERONE CAPROATE 250 MG: 250 INJECTION INTRAMUSCULAR at 02:03

## 2018-03-15 NOTE — PROGRESS NOTES
Per [place] protocol, gave pt. Kiara injection to right deltoid area. Pt. advised to remain in the waiting area for 15 minutes to ensure no adverse reaction. Pt. given future [place] dates. tawana kauffman

## 2018-03-20 ENCOUNTER — ROUTINE PRENATAL (OUTPATIENT)
Dept: OBSTETRICS AND GYNECOLOGY | Facility: CLINIC | Age: 23
End: 2018-03-20
Payer: MEDICAID

## 2018-03-20 VITALS — BODY MASS INDEX: 28.53 KG/M2 | WEIGHT: 156 LBS | DIASTOLIC BLOOD PRESSURE: 85 MMHG | SYSTOLIC BLOOD PRESSURE: 115 MMHG

## 2018-03-20 DIAGNOSIS — O09.893 HISTORY OF PRETERM DELIVERY, CURRENTLY PREGNANT IN THIRD TRIMESTER: Primary | ICD-10-CM

## 2018-03-20 PROCEDURE — 99213 OFFICE O/P EST LOW 20 MIN: CPT | Mod: TH,S$PBB,, | Performed by: ADVANCED PRACTICE MIDWIFE

## 2018-03-20 PROCEDURE — 99212 OFFICE O/P EST SF 10 MIN: CPT | Mod: PBBFAC,TH,PN | Performed by: ADVANCED PRACTICE MIDWIFE

## 2018-03-20 PROCEDURE — 99999 PR PBB SHADOW E&M-EST. PATIENT-LVL II: CPT | Mod: PBBFAC,,, | Performed by: ADVANCED PRACTICE MIDWIFE

## 2018-03-20 NOTE — PROGRESS NOTES
Doing well, denies problems.   Reports contractions 4-5 times per day.   Kiara weekly, PTL discussion.  Taking floradix.    Expressed desire for BTL. Reviewed all forms of contraception with patient including pills, patch, ring, Depo Provera, Nexplanon, Paraguard, and Mirena. Reviewed vasectomy and tubal ligation with patient. Explained that partner vasectomy is a reliable form of contraception and is considered a safer procedure than tubal ligation. Reviewed ~ 1/200 risk of failure of BTL along with 33% risk of ectopic pregnancy if patient conceives after tubal ligation. States she understands this risk and all questions were answered. Will plan for BTL at time of delivery or 4-6 weeks after delivery.      Coffective counseling sheet Protect Breastfeeding discussed with mother. Reinforced avoidence of artifical nipples and formula, unless medically indicated.  Encouraged mother to download Coffective mobile ulises if she has not already done so. Mother verbalizes understanding.

## 2018-03-27 ENCOUNTER — CLINICAL SUPPORT (OUTPATIENT)
Dept: OBSTETRICS AND GYNECOLOGY | Facility: CLINIC | Age: 23
End: 2018-03-27
Payer: MEDICAID

## 2018-03-27 DIAGNOSIS — O09.893 HISTORY OF PRETERM DELIVERY, CURRENTLY PREGNANT IN THIRD TRIMESTER: Primary | ICD-10-CM

## 2018-03-27 PROCEDURE — 99999 PR PBB SHADOW E&M-EST. PATIENT-LVL I: CPT | Mod: PBBFAC,,,

## 2018-03-27 PROCEDURE — 96372 THER/PROPH/DIAG INJ SC/IM: CPT | Mod: PBBFAC,PN

## 2018-03-27 PROCEDURE — 99211 OFF/OP EST MAY X REQ PHY/QHP: CPT | Mod: PBBFAC,TH,PN,25

## 2018-03-27 RX ORDER — HYDROXYPROGESTERONE CAPROATE 250 MG/ML
250 INJECTION INTRAMUSCULAR
Status: DISCONTINUED | OUTPATIENT
Start: 2018-03-27 | End: 2018-04-12

## 2018-03-27 RX ADMIN — HYDROXYPROGESTERONE CAPROATE 250 MG: 250 INJECTION INTRAMUSCULAR at 09:03

## 2018-03-27 NOTE — PROGRESS NOTES
Per [place] protocol, gave pt. Kiara injection to left deltoid area. Pt. advised top remain in the waiting area for 15 minutes to ensure no adverse reaction. Pt. given future [place] dates. tawana kauffman

## 2018-04-04 ENCOUNTER — ROUTINE PRENATAL (OUTPATIENT)
Dept: OBSTETRICS AND GYNECOLOGY | Facility: CLINIC | Age: 23
End: 2018-04-04
Payer: MEDICAID

## 2018-04-04 ENCOUNTER — TELEPHONE (OUTPATIENT)
Dept: PHARMACY | Facility: CLINIC | Age: 23
End: 2018-04-04

## 2018-04-04 VITALS — BODY MASS INDEX: 29.26 KG/M2 | WEIGHT: 160 LBS | DIASTOLIC BLOOD PRESSURE: 65 MMHG | SYSTOLIC BLOOD PRESSURE: 106 MMHG

## 2018-04-04 DIAGNOSIS — O09.899 RUBELLA NON-IMMUNE STATUS, ANTEPARTUM: ICD-10-CM

## 2018-04-04 DIAGNOSIS — Z28.39 RUBELLA NON-IMMUNE STATUS, ANTEPARTUM: ICD-10-CM

## 2018-04-04 DIAGNOSIS — O09.893 HISTORY OF PRETERM DELIVERY, CURRENTLY PREGNANT IN THIRD TRIMESTER: Primary | ICD-10-CM

## 2018-04-04 DIAGNOSIS — O99.013 ANEMIA DURING PREGNANCY IN THIRD TRIMESTER: ICD-10-CM

## 2018-04-04 DIAGNOSIS — Z3A.33 33 WEEKS GESTATION OF PREGNANCY: ICD-10-CM

## 2018-04-04 PROBLEM — O09.891 HISTORY OF PRETERM DELIVERY, CURRENTLY PREGNANT IN FIRST TRIMESTER: Status: RESOLVED | Noted: 2017-10-03 | Resolved: 2018-04-04

## 2018-04-04 PROCEDURE — 99213 OFFICE O/P EST LOW 20 MIN: CPT | Mod: PBBFAC,TH,PN | Performed by: MIDWIFE

## 2018-04-04 PROCEDURE — 99212 OFFICE O/P EST SF 10 MIN: CPT | Mod: TH,S$PBB,, | Performed by: MIDWIFE

## 2018-04-04 PROCEDURE — 99999 PR PBB SHADOW E&M-EST. PATIENT-LVL III: CPT | Mod: PBBFAC,,, | Performed by: MIDWIFE

## 2018-04-04 RX ORDER — HYDROXYPROGESTERONE CAPROATE 250 MG/ML
250 INJECTION INTRAMUSCULAR
Status: COMPLETED | OUTPATIENT
Start: 2018-04-04 | End: 2018-04-04

## 2018-04-04 RX ADMIN — HYDROXYPROGESTERONE CAPROATE 250 MG: 250 INJECTION INTRAMUSCULAR at 02:04

## 2018-04-04 NOTE — PROGRESS NOTES
After identifying patient with 2 identifiers, verifying that patient wished to receive flu vaccine, reviewing allergies, 250 mg. [place] administered to right deltoid.  Patient tolerated well.  Patient instructed to wait 15 minutes and verbalized understanding.  Date for next injection given and appointment scheduled.  AVS printed and given to patient.

## 2018-04-12 ENCOUNTER — CLINICAL SUPPORT (OUTPATIENT)
Dept: OBSTETRICS AND GYNECOLOGY | Facility: CLINIC | Age: 23
End: 2018-04-12
Payer: MEDICAID

## 2018-04-12 DIAGNOSIS — O09.893 HISTORY OF PRETERM DELIVERY, CURRENTLY PREGNANT IN THIRD TRIMESTER: Primary | ICD-10-CM

## 2018-04-12 PROCEDURE — 99211 OFF/OP EST MAY X REQ PHY/QHP: CPT | Mod: PBBFAC,TH,PN

## 2018-04-12 PROCEDURE — 99999 PR PBB SHADOW E&M-EST. PATIENT-LVL I: CPT | Mod: PBBFAC,,,

## 2018-04-12 PROCEDURE — 96372 THER/PROPH/DIAG INJ SC/IM: CPT | Mod: PBBFAC,PN

## 2018-04-12 RX ORDER — HYDROXYPROGESTERONE CAPROATE 250 MG/ML
250 INJECTION INTRAMUSCULAR
Status: DISCONTINUED | OUTPATIENT
Start: 2018-04-12 | End: 2018-04-18

## 2018-04-12 RX ADMIN — HYDROXYPROGESTERONE CAPROATE 250 MG: 250 INJECTION INTRAMUSCULAR at 09:04

## 2018-04-12 NOTE — PROGRESS NOTES
Per [place] protocol, gave pt. Kiara injection to right deltoid area. Pt. advised to remain in the waiting area for 15 minutes to ensure no adverse reaction. Pt. given future appt. dates. tawana kauffman

## 2018-04-18 ENCOUNTER — ROUTINE PRENATAL (OUTPATIENT)
Dept: OBSTETRICS AND GYNECOLOGY | Facility: CLINIC | Age: 23
End: 2018-04-18
Payer: MEDICAID

## 2018-04-18 VITALS — SYSTOLIC BLOOD PRESSURE: 112 MMHG | DIASTOLIC BLOOD PRESSURE: 72 MMHG | WEIGHT: 165 LBS | BODY MASS INDEX: 30.18 KG/M2

## 2018-04-18 DIAGNOSIS — Z3A.35 35 WEEKS GESTATION OF PREGNANCY: ICD-10-CM

## 2018-04-18 DIAGNOSIS — O26.893 HEARTBURN DURING PREGNANCY IN THIRD TRIMESTER: ICD-10-CM

## 2018-04-18 DIAGNOSIS — R12 HEARTBURN DURING PREGNANCY IN THIRD TRIMESTER: ICD-10-CM

## 2018-04-18 DIAGNOSIS — O09.893 HISTORY OF PRETERM DELIVERY, CURRENTLY PREGNANT IN THIRD TRIMESTER: Primary | ICD-10-CM

## 2018-04-18 PROCEDURE — 99212 OFFICE O/P EST SF 10 MIN: CPT | Mod: TH,S$PBB,, | Performed by: MIDWIFE

## 2018-04-18 PROCEDURE — 99213 OFFICE O/P EST LOW 20 MIN: CPT | Mod: PBBFAC,TH,PN | Performed by: MIDWIFE

## 2018-04-18 PROCEDURE — 87081 CULTURE SCREEN ONLY: CPT

## 2018-04-18 PROCEDURE — 99999 PR PBB SHADOW E&M-EST. PATIENT-LVL III: CPT | Mod: PBBFAC,,, | Performed by: MIDWIFE

## 2018-04-18 RX ORDER — HYDROXYPROGESTERONE CAPROATE 250 MG/ML
250 INJECTION INTRAMUSCULAR
Status: COMPLETED | OUTPATIENT
Start: 2018-04-18 | End: 2018-04-18

## 2018-04-18 RX ORDER — PANTOPRAZOLE SODIUM 20 MG/1
20 TABLET, DELAYED RELEASE ORAL DAILY
Qty: 30 TABLET | Refills: 1 | Status: SHIPPED | OUTPATIENT
Start: 2018-04-18 | End: 2018-05-30

## 2018-04-18 RX ADMIN — HYDROXYPROGESTERONE CAPROATE 250 MG: 250 INJECTION INTRAMUSCULAR at 09:04

## 2018-04-18 NOTE — PROGRESS NOTES
After identifying patient with 2 identifiers, verifying that patient wished to receive Kiara, reviewing allergies, [place] 250 mg administered to left ventrogluteal.  Patient tolerated well.  Patient instructed to wait 15 minutes and verbalized understanding.  Next appointment scheduled and AVS printed and given to patient.

## 2018-04-18 NOTE — PROGRESS NOTES
Doing well, good fm, GBS and last Kiara today, reviewed when to go to hospital, s2s, rooming in, DOES NOT WANT CIRC, rtc 1 wk

## 2018-04-21 ENCOUNTER — HOSPITAL ENCOUNTER (OUTPATIENT)
Facility: HOSPITAL | Age: 23
Discharge: HOME | End: 2018-04-22
Attending: OBSTETRICS & GYNECOLOGY | Admitting: OBSTETRICS & GYNECOLOGY
Payer: MEDICAID

## 2018-04-21 DIAGNOSIS — O47.03 THREATENED PRETERM LABOR, THIRD TRIMESTER: ICD-10-CM

## 2018-04-21 LAB
BACTERIA SPEC AEROBE CULT: NORMAL
HIV1+2 IGG SERPL QL IA.RAPID: NEGATIVE
RPR SER QL: NORMAL

## 2018-04-21 PROCEDURE — 96376 TX/PRO/DX INJ SAME DRUG ADON: CPT

## 2018-04-21 PROCEDURE — 25000003 PHARM REV CODE 250: Performed by: MIDWIFE

## 2018-04-21 PROCEDURE — 96374 THER/PROPH/DIAG INJ IV PUSH: CPT

## 2018-04-21 PROCEDURE — 99211 OFF/OP EST MAY X REQ PHY/QHP: CPT | Mod: 25,TH

## 2018-04-21 PROCEDURE — 96361 HYDRATE IV INFUSION ADD-ON: CPT | Mod: 59

## 2018-04-21 PROCEDURE — 96360 HYDRATION IV INFUSION INIT: CPT

## 2018-04-21 PROCEDURE — 86703 HIV-1/HIV-2 1 RESULT ANTBDY: CPT

## 2018-04-21 PROCEDURE — 96372 THER/PROPH/DIAG INJ SC/IM: CPT

## 2018-04-21 PROCEDURE — 59025 FETAL NON-STRESS TEST: CPT

## 2018-04-21 PROCEDURE — 63600175 PHARM REV CODE 636 W HCPCS: Performed by: MIDWIFE

## 2018-04-21 PROCEDURE — 86592 SYPHILIS TEST NON-TREP QUAL: CPT

## 2018-04-21 RX ORDER — ACETAMINOPHEN 500 MG
500 TABLET ORAL EVERY 6 HOURS PRN
Status: DISCONTINUED | OUTPATIENT
Start: 2018-04-21 | End: 2018-04-22 | Stop reason: HOSPADM

## 2018-04-21 RX ORDER — ONDANSETRON 8 MG/1
8 TABLET, ORALLY DISINTEGRATING ORAL EVERY 8 HOURS PRN
Status: DISCONTINUED | OUTPATIENT
Start: 2018-04-21 | End: 2018-04-22 | Stop reason: HOSPADM

## 2018-04-21 RX ORDER — BUTORPHANOL TARTRATE 2 MG/ML
1 INJECTION INTRAMUSCULAR; INTRAVENOUS ONCE
Status: COMPLETED | OUTPATIENT
Start: 2018-04-22 | End: 2018-04-21

## 2018-04-21 RX ORDER — TERBUTALINE SULFATE 1 MG/ML
0.25 INJECTION SUBCUTANEOUS ONCE
Status: COMPLETED | OUTPATIENT
Start: 2018-04-21 | End: 2018-04-21

## 2018-04-21 RX ORDER — BUTORPHANOL TARTRATE 2 MG/ML
1 INJECTION INTRAMUSCULAR; INTRAVENOUS ONCE
Status: COMPLETED | OUTPATIENT
Start: 2018-04-21 | End: 2018-04-21

## 2018-04-21 RX ORDER — SODIUM CHLORIDE, SODIUM LACTATE, POTASSIUM CHLORIDE, CALCIUM CHLORIDE 600; 310; 30; 20 MG/100ML; MG/100ML; MG/100ML; MG/100ML
INJECTION, SOLUTION INTRAVENOUS CONTINUOUS
Status: DISCONTINUED | OUTPATIENT
Start: 2018-04-21 | End: 2018-04-22 | Stop reason: HOSPADM

## 2018-04-21 RX ADMIN — TERBUTALINE SULFATE 0.25 MG: 1 INJECTION, SOLUTION SUBCUTANEOUS at 11:04

## 2018-04-21 RX ADMIN — BUTORPHANOL TARTRATE 1 MG: 2 INJECTION, SOLUTION INTRAMUSCULAR; INTRAVENOUS at 03:04

## 2018-04-21 RX ADMIN — BUTORPHANOL TARTRATE 1 MG: 2 INJECTION, SOLUTION INTRAMUSCULAR; INTRAVENOUS at 11:04

## 2018-04-21 RX ADMIN — SODIUM CHLORIDE, SODIUM LACTATE, POTASSIUM CHLORIDE, AND CALCIUM CHLORIDE: .6; .31; .03; .02 INJECTION, SOLUTION INTRAVENOUS at 11:04

## 2018-04-21 RX ADMIN — SODIUM CHLORIDE, SODIUM LACTATE, POTASSIUM CHLORIDE, AND CALCIUM CHLORIDE: .6; .31; .03; .02 INJECTION, SOLUTION INTRAVENOUS at 10:04

## 2018-04-21 RX ADMIN — SODIUM CHLORIDE, SODIUM LACTATE, POTASSIUM CHLORIDE, AND CALCIUM CHLORIDE 1000 ML: .6; .31; .03; .02 INJECTION, SOLUTION INTRAVENOUS at 10:04

## 2018-04-21 NOTE — PROGRESS NOTES
S: Pt feels ctx's are less intense, afraid to go home and have to come back if they start again    O: VSS   FHT's Cat 1   TOCO irregular   FT per RN    A:  uterine ctx's with no cervical change    P: Monitor overnight for signs of active labor

## 2018-04-21 NOTE — HOSPITAL COURSE
NST  IV fluids  [place]  4/22/18 Overnight observation, states feeling better, rare UC, Cat 1 tracing, will d/c home

## 2018-04-21 NOTE — SUBJECTIVE & OBJECTIVE
Obstetric HPI:  Patient reports  contractions, active fetal movement, No vaginal bleeding , No loss of fluid     This pregnancy has been complicated by hx of PTD X 2, finished domingo, anemia, rubella nonimmune, shortening cervix in 2nd trimester    Obstetric History       T0      L2     SAB0   TAB0   Ectopic0   Multiple0   Live Births0       # Outcome Date GA Lbr Gennaro/2nd Weight Sex Delivery Anes PTL Lv   3 Current            2             1                  Past Medical History:   Diagnosis Date    Abnormal Pap smear of cervix      History reviewed. No pertinent surgical history.    PTA Medications   Medication Sig    ferrous sulfate 325 mg (65 mg iron) Tab tablet Take 1 tablet (325 mg total) by mouth 2 (two) times daily.    pantoprazole (PROTONIX) 20 MG tablet Take 1 tablet (20 mg total) by mouth once daily.    PNV,calcium 72-iron-folic acid 27 mg iron- 1 mg Tab Take 1 tablet (1 each total) by mouth once daily.       Review of patient's allergies indicates:  No Known Allergies     Family History     None        Social History Main Topics    Smoking status: Never Smoker    Smokeless tobacco: Never Used    Alcohol use No    Drug use: No    Sexual activity: Yes     Partners: Male     Review of Systems   Gastrointestinal: Positive for abdominal pain.      Objective:     Vital Signs (Most Recent):  Temp: 98.5 °F (36.9 °C) (18 1130)  Pulse: 81 (18 1130)  Resp: 17 (18 1125)  BP: 113/60 (18 1130) Vital Signs (24h Range):  Temp:  [98.5 °F (36.9 °C)] 98.5 °F (36.9 °C)  Pulse:  [81-89] 81  Resp:  [17] 17  BP: (107-116)/(60-65) 113/60     Weight: 74.8 kg (165 lb)  Body mass index is 28.32 kg/m².    FHT: Cat 1 (reassuring)  TOCO:  Q 3-4 minutes    Physical Exam:   Constitutional: She is oriented to person, place, and time. She appears well-developed and well-nourished.    HENT:   Head: Normocephalic.     Neck: Normal range of motion.     Pulmonary/Chest: Effort  normal.        Abdominal: Soft.             Musculoskeletal: Normal range of motion.       Neurological: She is alert and oriented to person, place, and time.    Skin: Skin is warm and dry.    Psychiatric: She has a normal mood and affect. Her behavior is normal. Judgment and thought content normal.       Cervix:  Dilation:  .5  Effacement:  50%  Station: -3  Presentation: Vertex     Significant Labs:  Lab Results   Component Value Date    GROUPTRH O POS 10/23/2017    HEPBSAG Negative 10/23/2017    STREPBCULT Normal cervicovaginal jhon present 04/18/2018       I have personallly reviewed all pertinent lab results from the last 24 hours.

## 2018-04-21 NOTE — H&P
[facility] -   Obstetrics  History & Physical    Patient Name: Mattie Bledsoe  MRN: [id number]  Admission Date: 2018  Primary Care Provider: Primary Doctor No    Subjective:     Principal Problem:Threatened  labor, third trimester    History of Present Illness:  Pt presented to unit w/ c/o ctx's    Obstetric HPI:  Patient reports  contractions, active fetal movement, No vaginal bleeding , No loss of fluid     This pregnancy has been complicated by hx of PTD X 2, finished domingo, anemia, rubella nonimmune, shortening cervix in 2nd trimester    Obstetric History       T0      L2     SAB0   TAB0   Ectopic0   Multiple0   Live Births0       # Outcome Date GA Lbr Gennaro/2nd Weight Sex Delivery Anes PTL Lv   3 Current            2             1                  Past Medical History:   Diagnosis Date    Abnormal Pap smear of cervix      History reviewed. No pertinent surgical history.    PTA Medications   Medication Sig    ferrous sulfate 325 mg (65 mg iron) Tab tablet Take 1 tablet (325 mg total) by mouth 2 (two) times daily.    pantoprazole (PROTONIX) 20 MG tablet Take 1 tablet (20 mg total) by mouth once daily.    PNV,calcium 72-iron-folic acid 27 mg iron- 1 mg Tab Take 1 tablet (1 each total) by mouth once daily.       Review of patient's allergies indicates:  No Known Allergies     Family History     None        Social History Main Topics    Smoking status: Never Smoker    Smokeless tobacco: Never Used    Alcohol use No    Drug use: No    Sexual activity: Yes     Partners: Male     Review of Systems   Gastrointestinal: Positive for abdominal pain.      Objective:     Vital Signs (Most Recent):  Temp: 98.5 °F (36.9 °C) (18 1130)  Pulse: 81 (18 1130)  Resp: 17 (18 1125)  BP: 113/60 (18 1130) Vital Signs (24h Range):  Temp:  [98.5 °F (36.9 °C)] 98.5 °F (36.9 °C)  Pulse:  [81-89] 81  Resp:  [17] 17  BP: (107-116)/(60-65) 113/60     Weight:  74.8 kg (165 lb)  Body mass index is 28.32 kg/m².    FHT: Cat 1 (reassuring)  TOCO:  Q 3-4 minutes    Physical Exam:   Constitutional: She is oriented to person, place, and time. She appears well-developed and well-nourished.    HENT:   Head: Normocephalic.     Neck: Normal range of motion.     Pulmonary/Chest: Effort normal.        Abdominal: Soft.             Musculoskeletal: Normal range of motion.       Neurological: She is alert and oriented to person, place, and time.    Skin: Skin is warm and dry.    Psychiatric: She has a normal mood and affect. Her behavior is normal. Judgment and thought content normal.       Cervix:  Dilation:  .5  Effacement:  50%  Station: -3  Presentation: Vertex     Significant Labs:  Lab Results   Component Value Date    GROUPTRH O POS 10/23/2017    HEPBSAG Negative 10/23/2017    STREPBCULT Normal cervicovaginal jhon present 2018       I have personallly reviewed all pertinent lab results from the last 24 hours.    Assessment/Plan:     22 y.o. female  at 35w6d for:    * Threatened  labor, third trimester    NST  IV fluids  Georgiana Clemens, SINDI  Obstetrics  [facility] -

## 2018-04-22 VITALS
DIASTOLIC BLOOD PRESSURE: 65 MMHG | WEIGHT: 165 LBS | HEIGHT: 64 IN | HEART RATE: 90 BPM | SYSTOLIC BLOOD PRESSURE: 126 MMHG | TEMPERATURE: 98 F | BODY MASS INDEX: 28.17 KG/M2 | RESPIRATION RATE: 18 BRPM

## 2018-04-22 PROCEDURE — 59025 FETAL NON-STRESS TEST: CPT | Mod: 26,,, | Performed by: ADVANCED PRACTICE MIDWIFE

## 2018-04-22 PROCEDURE — 63600175 PHARM REV CODE 636 W HCPCS: Performed by: MIDWIFE

## 2018-04-22 PROCEDURE — 59025 FETAL NON-STRESS TEST: CPT

## 2018-04-22 PROCEDURE — 96361 HYDRATE IV INFUSION ADD-ON: CPT | Mod: 59

## 2018-04-22 PROCEDURE — 99213 OFFICE O/P EST LOW 20 MIN: CPT | Mod: TH,25,, | Performed by: MIDWIFE

## 2018-04-22 NOTE — PROGRESS NOTES
Pt resting, did not wake, RN stated pt has been sleeping, FHT's Cat 1 w/ occasional uterine irritability, will d/c this am upon waking

## 2018-04-22 NOTE — PROCEDURES
"Mattie Bledsoe is a 22 y.o. female patient.    Temp: 98.5 °F (36.9 °C) (04/21/18 1130)  Pulse: 76 (04/22/18 0505)  Resp: 17 (04/21/18 1125)  BP: 115/67 (04/22/18 0505)  Weight: 74.8 kg (165 lb) (04/21/18 1029)  Height: 5' 4" (162.6 cm) (04/21/18 1029)       Obtain Fetal nonstress test (NST)  Date/Time: 4/22/2018 8:13 AM  Performed by: JENELLE RUIZ  Authorized by: MARY ZAVALA     Nonstress Test:     Variability:  6-25 BPM    Decelerations:  None    Accelerations:  15 bpm    Acoustic Stimulator: No      Baseline:  130    Uterine Irritability: No      Contraction Frequency:  Rare  Biophysical Profile:     Nonstress Test Interpretation: reactive      Overall Impression:  Reassuring  Post-procedure:     Patient tolerance:  Patient tolerated the procedure well with no immediate complications        Jenelle Ruiz  4/22/2018    "

## 2018-04-22 NOTE — DISCHARGE SUMMARY
[facility] -   Obstetrics  Discharge Summary      Patient Name: Mattie Bledsoe  MRN: [id number]  Admission Date: 2018  Hospital Length of Stay: 0 days  Discharge Date and Time:  2018 8:12 AM  Attending Physician: Paulette Bolton MD   Discharging Provider: Jenelle Josue CNM  Primary Care Provider: Primary Doctor No    HPI: Pt presented to unit w/ c/o ctx's       * No surgery found *     Hospital Course:   NST  IV fluids  Brethine  18 Overnight observation, states feeling better, rare UC, Cat 1 tracing, will d/c home               Final Active Diagnoses:    Diagnosis Date Noted POA    PRINCIPAL PROBLEM:  Threatened  labor, third trimester [O47.03] 2018 Yes    Cervical shortening in second trimester-  29 mm [O26.872] 2018 Yes      Problems Resolved During this Admission:    Diagnosis Date Noted Date Resolved POA          Feeding Method: NA    Immunizations     None          This patient has no babies on file.  Pending Diagnostic Studies:     None          Discharged Condition: good    Disposition: Home or Self Care    Follow Up:  Follow-up Information     Follow up On 2018.               Patient Instructions:     Diet Adult Regular     Activity as tolerated       Medications:  Current Discharge Medication List      CONTINUE these medications which have NOT CHANGED    Details   ferrous sulfate 325 mg (65 mg iron) Tab tablet Take 1 tablet (325 mg total) by mouth 2 (two) times daily.  Qty: 90 tablet, Refills: 2    Associated Diagnoses: Anemia, unspecified type      pantoprazole (PROTONIX) 20 MG tablet Take 1 tablet (20 mg total) by mouth once daily.  Qty: 30 tablet, Refills: 1    Associated Diagnoses: Heartburn during pregnancy in third trimester      PNV,calcium 72-iron-folic acid 27 mg iron- 1 mg Tab Take 1 tablet (1 each total) by mouth once daily.  Qty: 30 tablet, Refills: 11    Comments: May substitute with formulary vitamin covered on plan.             Jenelle  SINDI Josue  Obstetrics  [facility] [place]

## 2018-04-25 ENCOUNTER — ROUTINE PRENATAL (OUTPATIENT)
Dept: OBSTETRICS AND GYNECOLOGY | Facility: CLINIC | Age: 23
End: 2018-04-25
Payer: MEDICAID

## 2018-04-25 VITALS
SYSTOLIC BLOOD PRESSURE: 112 MMHG | DIASTOLIC BLOOD PRESSURE: 72 MMHG | BODY MASS INDEX: 29.52 KG/M2 | WEIGHT: 171.94 LBS

## 2018-04-25 DIAGNOSIS — Z3A.36 36 WEEKS GESTATION OF PREGNANCY: ICD-10-CM

## 2018-04-25 DIAGNOSIS — O09.893 HISTORY OF PRETERM DELIVERY, CURRENTLY PREGNANT IN THIRD TRIMESTER: Primary | ICD-10-CM

## 2018-04-25 PROCEDURE — 99999 PR PBB SHADOW E&M-EST. PATIENT-LVL II: CPT | Mod: PBBFAC,,, | Performed by: MIDWIFE

## 2018-04-25 PROCEDURE — 99212 OFFICE O/P EST SF 10 MIN: CPT | Mod: TH,S$PBB,, | Performed by: MIDWIFE

## 2018-04-25 PROCEDURE — 99212 OFFICE O/P EST SF 10 MIN: CPT | Mod: PBBFAC,TH,PN | Performed by: MIDWIFE

## 2018-04-25 NOTE — PROGRESS NOTES
Doing well, good fm, ready for baby, cervix unchanged, some dizziness and light headedness, increase water, frequent smaller meals, some ctx's but nothing consistent, rtc 1 wk

## 2018-04-30 ENCOUNTER — HOSPITAL ENCOUNTER (OUTPATIENT)
Facility: HOSPITAL | Age: 23
Discharge: HOME | End: 2018-04-30
Attending: OBSTETRICS & GYNECOLOGY | Admitting: OBSTETRICS & GYNECOLOGY
Payer: MEDICAID

## 2018-04-30 VITALS
SYSTOLIC BLOOD PRESSURE: 121 MMHG | RESPIRATION RATE: 20 BRPM | HEART RATE: 79 BPM | HEIGHT: 62 IN | DIASTOLIC BLOOD PRESSURE: 63 MMHG | WEIGHT: 172 LBS | BODY MASS INDEX: 31.65 KG/M2

## 2018-04-30 DIAGNOSIS — N76.0 BACTERIAL VAGINOSIS: Primary | ICD-10-CM

## 2018-04-30 DIAGNOSIS — M54.9 BACK PAIN AFFECTING PREGNANCY IN THIRD TRIMESTER: ICD-10-CM

## 2018-04-30 DIAGNOSIS — B96.89 BACTERIAL VAGINOSIS: Primary | ICD-10-CM

## 2018-04-30 DIAGNOSIS — M54.9 BACK PAIN AFFECTING PREGNANCY: ICD-10-CM

## 2018-04-30 DIAGNOSIS — O99.891 BACK PAIN AFFECTING PREGNANCY: ICD-10-CM

## 2018-04-30 DIAGNOSIS — O99.891 BACK PAIN AFFECTING PREGNANCY IN THIRD TRIMESTER: ICD-10-CM

## 2018-04-30 DIAGNOSIS — O28.8 AFI (AMNIOTIC FLUID INDEX) BORDERLINE LOW: Primary | ICD-10-CM

## 2018-04-30 LAB
BACTERIA #/AREA URNS HPF: ABNORMAL /HPF
BILIRUB UR QL STRIP: NEGATIVE
CLARITY UR: CLEAR
COLOR UR: YELLOW
GLUCOSE UR QL STRIP: NEGATIVE
HGB UR QL STRIP: NEGATIVE
KETONES UR QL STRIP: NEGATIVE
LEUKOCYTE ESTERASE UR QL STRIP: ABNORMAL
MICROSCOPIC COMMENT: ABNORMAL
NITRITE UR QL STRIP: NEGATIVE
PH UR STRIP: 7 [PH] (ref 5–8)
PROT UR QL STRIP: NEGATIVE
RBC #/AREA URNS HPF: 2 /HPF (ref 0–4)
SP GR UR STRIP: 1.02 (ref 1–1.03)
SQUAMOUS #/AREA URNS HPF: 10 /HPF
URN SPEC COLLECT METH UR: ABNORMAL
UROBILINOGEN UR STRIP-ACNC: NEGATIVE EU/DL
WBC #/AREA URNS HPF: 7 /HPF (ref 0–5)

## 2018-04-30 PROCEDURE — 81000 URINALYSIS NONAUTO W/SCOPE: CPT

## 2018-04-30 PROCEDURE — 87491 CHLMYD TRACH DNA AMP PROBE: CPT

## 2018-04-30 PROCEDURE — 99211 OFF/OP EST MAY X REQ PHY/QHP: CPT | Mod: TH

## 2018-04-30 PROCEDURE — 99214 OFFICE O/P EST MOD 30 MIN: CPT | Mod: 25,TH,, | Performed by: ADVANCED PRACTICE MIDWIFE

## 2018-04-30 PROCEDURE — 59025 FETAL NON-STRESS TEST: CPT | Mod: 26,,, | Performed by: ADVANCED PRACTICE MIDWIFE

## 2018-04-30 PROCEDURE — 59025 FETAL NON-STRESS TEST: CPT

## 2018-04-30 RX ORDER — ONDANSETRON 8 MG/1
8 TABLET, ORALLY DISINTEGRATING ORAL EVERY 8 HOURS PRN
Status: DISCONTINUED | OUTPATIENT
Start: 2018-04-30 | End: 2018-04-30 | Stop reason: HOSPADM

## 2018-04-30 RX ORDER — METRONIDAZOLE 250 MG/1
500 TABLET ORAL EVERY 12 HOURS
Qty: 14 TABLET | Refills: 0 | Status: ON HOLD | OUTPATIENT
Start: 2018-04-30 | End: 2018-05-18 | Stop reason: HOSPADM

## 2018-04-30 RX ORDER — ACETAMINOPHEN 500 MG
500 TABLET ORAL EVERY 6 HOURS PRN
Status: DISCONTINUED | OUTPATIENT
Start: 2018-04-30 | End: 2018-04-30 | Stop reason: HOSPADM

## 2018-04-30 NOTE — PROGRESS NOTES
No lof noted or reported per pt, none noted to tomi pad. VE 1/th/high posterior, green tinged dc noted.

## 2018-04-30 NOTE — SUBJECTIVE & OBJECTIVE
Obstetric HPI:  Patient reports with back pain and leaking of fluid denies contractions, active fetal movement, No vaginal bleeding , Yes loss of fluid     This pregnancy has been complicated by h/p ptd x 2 on Mikena and h/o hpv    Obstetric History       T0      L2     SAB0   TAB0   Ectopic0   Multiple0   Live Births0       # Outcome Date GA Lbr Gennaro/2nd Weight Sex Delivery Anes PTL Lv   3 Current            2             1                  Past Medical History:   Diagnosis Date    Abnormal Pap smear of cervix      No past surgical history on file.    PTA Medications   Medication Sig    ferrous sulfate 325 mg (65 mg iron) Tab tablet Take 1 tablet (325 mg total) by mouth 2 (two) times daily.    pantoprazole (PROTONIX) 20 MG tablet Take 1 tablet (20 mg total) by mouth once daily.    PNV,calcium 72-iron-folic acid 27 mg iron- 1 mg Tab Take 1 tablet (1 each total) by mouth once daily.       Review of patient's allergies indicates:  No Known Allergies     Family History     None        Social History Main Topics    Smoking status: Never Smoker    Smokeless tobacco: Never Used    Alcohol use No    Drug use: No    Sexual activity: Yes     Partners: Male     Review of Systems   Genitourinary: Positive for vaginal discharge and vaginal pain.   Musculoskeletal: Positive for back pain.      Objective:     Vital Signs (Most Recent):    Vital Signs (24h Range):           There is no height or weight on file to calculate BMI.    FHT: 140Cat 1 (reassuring)  TOCO: ireg ctx noted     Physical Exam:   Constitutional: She is oriented to person, place, and time. She appears well-developed and well-nourished.    HENT:   Head: Normocephalic.    Eyes: EOM are normal. Pupils are equal, round, and reactive to light.    Neck: Normal range of motion.     Pulmonary/Chest: Effort normal.        Abdominal: Soft.     Genitourinary:   Genitourinary Comments: Gravid, spec exam done green non odorous dc, thin  noted, neg fern, +wbc noted on wet prep           Musculoskeletal: Normal range of motion.       Neurological: She is alert and oriented to person, place, and time.    Skin: Skin is warm and dry.    Psychiatric: She has a normal mood and affect. Her behavior is normal. Judgment and thought content normal.       Cervix: deferred   Dilation:    Effacement:    Station:   Presentation:      Significant Labs:  Lab Results   Component Value Date    GROUPTRH O POS 10/23/2017    HEPBSAG Negative 10/23/2017    STREPBCULT No Group B Streptococcus isolated 04/18/2018       I have personallly reviewed all pertinent lab results from the last 24 hours.

## 2018-04-30 NOTE — H&P
[facility] -   Obstetrics  History & Physical    Patient Name: Mattie Bledsoe  MRN: [id number]  Admission Date: 2018  Primary Care Provider: Primary Doctor No    Subjective:     Principal Problem:Back pain affecting pregnancy in third trimester    History of Present Illness:  Presents with co back pain and leaking of fluid    Obstetric HPI:  Patient reports with back pain and leaking of fluid denies contractions, active fetal movement, No vaginal bleeding , Yes loss of fluid     This pregnancy has been complicated by h/p ptd x 2 on Mikena and h/o hpv    Obstetric History       T0      L2     SAB0   TAB0   Ectopic0   Multiple0   Live Births0       # Outcome Date GA Lbr Gennaro/2nd Weight Sex Delivery Anes PTL Lv   3 Current            2             1                  Past Medical History:   Diagnosis Date    Abnormal Pap smear of cervix      No past surgical history on file.    PTA Medications   Medication Sig    ferrous sulfate 325 mg (65 mg iron) Tab tablet Take 1 tablet (325 mg total) by mouth 2 (two) times daily.    pantoprazole (PROTONIX) 20 MG tablet Take 1 tablet (20 mg total) by mouth once daily.    PNV,calcium 72-iron-folic acid 27 mg iron- 1 mg Tab Take 1 tablet (1 each total) by mouth once daily.       Review of patient's allergies indicates:  No Known Allergies     Family History     None        Social History Main Topics    Smoking status: Never Smoker    Smokeless tobacco: Never Used    Alcohol use No    Drug use: No    Sexual activity: Yes     Partners: Male     Review of Systems   Genitourinary: Positive for vaginal discharge and vaginal pain.   Musculoskeletal: Positive for back pain.      Objective:     Vital Signs (Most Recent):    Vital Signs (24h Range):           There is no height or weight on file to calculate BMI.    FHT: 140Cat 1 (reassuring)  TOCO: ireg ctx noted     Physical Exam:   Constitutional: She is oriented to person, place, and  time. She appears well-developed and well-nourished.    HENT:   Head: Normocephalic.    Eyes: EOM are normal. Pupils are equal, round, and reactive to light.    Neck: Normal range of motion.     Pulmonary/Chest: Effort normal.        Abdominal: Soft.     Genitourinary:   Genitourinary Comments: Gravid, spec exam done green non odorous dc, thin noted, neg fern, +wbc noted on wet prep           Musculoskeletal: Normal range of motion.       Neurological: She is alert and oriented to person, place, and time.    Skin: Skin is warm and dry.    Psychiatric: She has a normal mood and affect. Her behavior is normal. Judgment and thought content normal.       Cervix: deferred   Dilation:    Effacement:    Station:   Presentation:      Significant Labs:  Lab Results   Component Value Date    GROUPTRH O POS 10/23/2017    HEPBSAG Negative 10/23/2017    STREPBCULT No Group B Streptococcus isolated 2018       I have personallly reviewed all pertinent lab results from the last 24 hours.    Assessment/Plan:     22 y.o. female  at 37w1d for:    * Back pain affecting pregnancy in third trimester    ua          Wet prep wbc noted, neg fern  Gen Probe and ua pending   Che Gonzalez CNM  Obstetrics  [facility] [place]

## 2018-04-30 NOTE — PROCEDURES
"Mattie Bledsoe is a 22 y.o. female patient.    Pulse: 79 (04/30/18 1633)  Resp: 20 (04/30/18 1633)  BP: 121/63 (04/30/18 1633)  Weight: 78 kg (172 lb) (04/30/18 1657)  Height: 5' 2" (157.5 cm) (04/30/18 1657)       Obtain Fetal nonstress test (NST)  Date/Time: 4/30/2018 6:00 PM  Performed by: CHE GONZALEZ  Authorized by: CHE GONZALEZ     Nonstress Test:     Variability:  6-25 BPM    Decelerations:  None    Accelerations:  15 bpm    Acoustic Stimulator: No      Uterine Irritability: No      Contractions:  Irregular  Biophysical Profile:     MVP (Maximal Vertical Pocket):  2.58    Nonstress Test Interpretation: reactive      Overall Impression:  Reassuring  Post-procedure:     Patient tolerance:  Patient tolerated the procedure well with no immediate complications        Che Gonzalez  4/30/2018    "

## 2018-04-30 NOTE — DISCHARGE SUMMARY
[facility] -   Obstetrics  Discharge Summary      Patient Name: Mattie Bledsoe  MRN: [id number]  Admission Date: 4/30/2018  Hospital Length of Stay: 0 days  Discharge Date and Time:  04/30/2018 5:55 PM  Attending Physician: Paulette Bolton MD   Discharging Provider: Che Gonzalez CNM  Primary Care Provider: Primary Doctor No    HPI: Presents with co back pain and leaking of fluid    * No surgery found *     Hospital Course:   Efm, wet prep, u/a, fern, GEN probe, nst, u/s w/bishnu     BISHNU WNL, presumptive tx for BV , encouraged po hydration    Final Active Diagnoses:    Diagnosis Date Noted POA    PRINCIPAL PROBLEM:  Back pain affecting pregnancy in third trimester [O26.893, M54.9] 04/30/2018 Unknown    Back pain affecting pregnancy [O26.899, M54.9] 04/30/2018 Yes    Bacterial vaginosis [N76.0, B96.89] 04/30/2018 Unknown      Problems Resolved During this Admission:    Diagnosis Date Noted Date Resolved POA        Labs: All labs within the past 24 hours have been reviewed        Immunizations     None          This patient has no babies on file.  Pending Diagnostic Studies:     Procedure Component Value Units Date/Time    US OB Limited 1 Or More Gestations [[id number]] Updated:  04/30/18 1728    Order Status:  Sent Lab Status:  In process           Discharged Condition: good    Disposition: Home or Self Care    Follow Up:  Follow-up Information     Follow up On 5/31/2018.               Patient Instructions:     Notify your health care provider if you experience any of the following:  temperature >100.4     Notify your health care provider if you experience any of the following:  severe uncontrolled pain       Medications:  Current Discharge Medication List      START taking these medications    Details   metroNIDAZOLE (FLAGYL) 250 MG tablet Take 2 tablets (500 mg total) by mouth every 12 (twelve) hours.  Qty: 14 tablet, Refills: 0         CONTINUE these medications which have NOT CHANGED    Details    ferrous sulfate 325 mg (65 mg iron) Tab tablet Take 1 tablet (325 mg total) by mouth 2 (two) times daily.  Qty: 90 tablet, Refills: 2    Associated Diagnoses: Anemia, unspecified type      pantoprazole (PROTONIX) 20 MG tablet Take 1 tablet (20 mg total) by mouth once daily.  Qty: 30 tablet, Refills: 1    Associated Diagnoses: Heartburn during pregnancy in third trimester      PNV,calcium 72-iron-folic acid 27 mg iron- 1 mg Tab Take 1 tablet (1 each total) by mouth once daily.  Qty: 30 tablet, Refills: 11    Comments: May substitute with formulary vitamin covered on plan.             Che Gonzalez CNM  Obstetrics  [facility]

## 2018-05-01 ENCOUNTER — TELEPHONE (OUTPATIENT)
Dept: PHARMACY | Facility: CLINIC | Age: 23
End: 2018-05-01

## 2018-05-01 LAB
C TRACH DNA SPEC QL NAA+PROBE: NOT DETECTED
N GONORRHOEA DNA SPEC QL NAA+PROBE: NOT DETECTED

## 2018-05-03 ENCOUNTER — PROCEDURE VISIT (OUTPATIENT)
Dept: OBSTETRICS AND GYNECOLOGY | Facility: CLINIC | Age: 23
End: 2018-05-03
Payer: MEDICAID

## 2018-05-03 ENCOUNTER — ROUTINE PRENATAL (OUTPATIENT)
Dept: OBSTETRICS AND GYNECOLOGY | Facility: CLINIC | Age: 23
End: 2018-05-03
Payer: MEDICAID

## 2018-05-03 VITALS
SYSTOLIC BLOOD PRESSURE: 122 MMHG | DIASTOLIC BLOOD PRESSURE: 80 MMHG | WEIGHT: 174.19 LBS | BODY MASS INDEX: 31.85 KG/M2

## 2018-05-03 DIAGNOSIS — O09.893 HISTORY OF PRETERM DELIVERY, CURRENTLY PREGNANT IN THIRD TRIMESTER: Primary | ICD-10-CM

## 2018-05-03 DIAGNOSIS — O28.8 AFI (AMNIOTIC FLUID INDEX) BORDERLINE LOW: ICD-10-CM

## 2018-05-03 PROBLEM — B96.89 BACTERIAL VAGINOSIS: Status: RESOLVED | Noted: 2018-04-30 | Resolved: 2018-05-03

## 2018-05-03 PROBLEM — O99.891 BACK PAIN AFFECTING PREGNANCY: Status: RESOLVED | Noted: 2018-04-30 | Resolved: 2018-05-03

## 2018-05-03 PROBLEM — M54.9 BACK PAIN AFFECTING PREGNANCY IN THIRD TRIMESTER: Status: RESOLVED | Noted: 2018-04-30 | Resolved: 2018-05-03

## 2018-05-03 PROBLEM — O99.891 BACK PAIN AFFECTING PREGNANCY IN THIRD TRIMESTER: Status: RESOLVED | Noted: 2018-04-30 | Resolved: 2018-05-03

## 2018-05-03 PROBLEM — N76.0 BACTERIAL VAGINOSIS: Status: RESOLVED | Noted: 2018-04-30 | Resolved: 2018-05-03

## 2018-05-03 PROBLEM — M54.9 BACK PAIN AFFECTING PREGNANCY: Status: RESOLVED | Noted: 2018-04-30 | Resolved: 2018-05-03

## 2018-05-03 PROBLEM — O47.03 THREATENED PRETERM LABOR, THIRD TRIMESTER: Status: RESOLVED | Noted: 2018-04-21 | Resolved: 2018-05-03

## 2018-05-03 PROCEDURE — 76816 OB US FOLLOW-UP PER FETUS: CPT | Mod: 26,S$PBB,, | Performed by: OBSTETRICS & GYNECOLOGY

## 2018-05-03 PROCEDURE — 76819 FETAL BIOPHYS PROFIL W/O NST: CPT | Mod: PBBFAC,PN | Performed by: OBSTETRICS & GYNECOLOGY

## 2018-05-03 PROCEDURE — 99212 OFFICE O/P EST SF 10 MIN: CPT | Mod: PBBFAC,TH,PN,25 | Performed by: ADVANCED PRACTICE MIDWIFE

## 2018-05-03 PROCEDURE — 76816 OB US FOLLOW-UP PER FETUS: CPT | Mod: PBBFAC,PN | Performed by: OBSTETRICS & GYNECOLOGY

## 2018-05-03 PROCEDURE — 99999 PR PBB SHADOW E&M-EST. PATIENT-LVL II: CPT | Mod: PBBFAC,,, | Performed by: ADVANCED PRACTICE MIDWIFE

## 2018-05-03 PROCEDURE — 76819 FETAL BIOPHYS PROFIL W/O NST: CPT | Mod: 26,S$PBB,, | Performed by: OBSTETRICS & GYNECOLOGY

## 2018-05-03 PROCEDURE — 99213 OFFICE O/P EST LOW 20 MIN: CPT | Mod: TH,S$PBB,, | Performed by: ADVANCED PRACTICE MIDWIFE

## 2018-05-03 NOTE — PROGRESS NOTES
Doing well   Ultrasound today with cephalic presentation, anterior placenta, MVP 2.0cm, BISHNU 5.0cm, EFW 23%, BPP 8/8, cervical and thoracic spine remain suboptimal, reviewed with pt, suggestions made for tub soaks and increased hydration, Will repeat ultrasound nv   Labor precautions and FKCs reviewed, when to go to hospital

## 2018-05-08 ENCOUNTER — TELEPHONE (OUTPATIENT)
Dept: OBSTETRICS AND GYNECOLOGY | Facility: CLINIC | Age: 23
End: 2018-05-08

## 2018-05-08 NOTE — TELEPHONE ENCOUNTER
----- Message from Marcus Miranda sent at 5/8/2018  8:16 AM CDT -----  Contact: pt   States she's calling rg wanting to be worked in to see Dr for some issues and req to come in today and can be reached at [phone]//thanks/dbw

## 2018-05-09 ENCOUNTER — TELEPHONE (OUTPATIENT)
Dept: PHARMACY | Facility: CLINIC | Age: 23
End: 2018-05-09

## 2018-05-09 ENCOUNTER — ROUTINE PRENATAL (OUTPATIENT)
Dept: OBSTETRICS AND GYNECOLOGY | Facility: CLINIC | Age: 23
End: 2018-05-09
Payer: MEDICAID

## 2018-05-09 ENCOUNTER — PROCEDURE VISIT (OUTPATIENT)
Dept: OBSTETRICS AND GYNECOLOGY | Facility: CLINIC | Age: 23
End: 2018-05-09
Payer: MEDICAID

## 2018-05-09 ENCOUNTER — LAB VISIT (OUTPATIENT)
Dept: LAB | Facility: HOSPITAL | Age: 23
End: 2018-05-09
Attending: ADVANCED PRACTICE MIDWIFE
Payer: MEDICAID

## 2018-05-09 DIAGNOSIS — O09.893 HISTORY OF PRETERM DELIVERY, CURRENTLY PREGNANT IN THIRD TRIMESTER: ICD-10-CM

## 2018-05-09 DIAGNOSIS — O28.8 AFI (AMNIOTIC FLUID INDEX) BORDERLINE LOW: ICD-10-CM

## 2018-05-09 DIAGNOSIS — O09.893 HISTORY OF PRETERM DELIVERY, CURRENTLY PREGNANT IN THIRD TRIMESTER: Primary | ICD-10-CM

## 2018-05-09 DIAGNOSIS — D64.9 ANEMIA, UNSPECIFIED TYPE: ICD-10-CM

## 2018-05-09 LAB
BASOPHILS # BLD AUTO: 0.03 K/UL
BASOPHILS NFR BLD: 0.3 %
DIFFERENTIAL METHOD: ABNORMAL
EOSINOPHIL # BLD AUTO: 0.1 K/UL
EOSINOPHIL NFR BLD: 0.9 %
ERYTHROCYTE [DISTWIDTH] IN BLOOD BY AUTOMATED COUNT: 14.1 %
HCT VFR BLD AUTO: 30.4 %
HGB BLD-MCNC: 9.4 G/DL
IMM GRANULOCYTES # BLD AUTO: 0.03 K/UL
IMM GRANULOCYTES NFR BLD AUTO: 0.3 %
LYMPHOCYTES # BLD AUTO: 2.2 K/UL
LYMPHOCYTES NFR BLD: 19.8 %
MCH RBC QN AUTO: 26.9 PG
MCHC RBC AUTO-ENTMCNC: 30.9 G/DL
MCV RBC AUTO: 87 FL
MONOCYTES # BLD AUTO: 0.9 K/UL
MONOCYTES NFR BLD: 8.3 %
NEUTROPHILS # BLD AUTO: 7.8 K/UL
NEUTROPHILS NFR BLD: 70.4 %
NRBC BLD-RTO: 0 /100 WBC
PLATELET # BLD AUTO: 213 K/UL
PMV BLD AUTO: 12.4 FL
RBC # BLD AUTO: 3.5 M/UL
WBC # BLD AUTO: 11.03 K/UL

## 2018-05-09 PROCEDURE — 76819 FETAL BIOPHYS PROFIL W/O NST: CPT | Mod: PBBFAC,PN | Performed by: OBSTETRICS & GYNECOLOGY

## 2018-05-09 PROCEDURE — 36415 COLL VENOUS BLD VENIPUNCTURE: CPT | Mod: PO

## 2018-05-09 PROCEDURE — 85025 COMPLETE CBC W/AUTO DIFF WBC: CPT

## 2018-05-09 PROCEDURE — 99999 PR PBB SHADOW E&M-EST. PATIENT-LVL I: CPT | Mod: PBBFAC,,, | Performed by: ADVANCED PRACTICE MIDWIFE

## 2018-05-09 PROCEDURE — 99211 OFF/OP EST MAY X REQ PHY/QHP: CPT | Mod: PBBFAC,TH,PN,25 | Performed by: ADVANCED PRACTICE MIDWIFE

## 2018-05-09 PROCEDURE — 76819 FETAL BIOPHYS PROFIL W/O NST: CPT | Mod: 26,S$PBB,, | Performed by: OBSTETRICS & GYNECOLOGY

## 2018-05-09 PROCEDURE — 99213 OFFICE O/P EST LOW 20 MIN: CPT | Mod: TH,S$PBB,, | Performed by: ADVANCED PRACTICE MIDWIFE

## 2018-05-09 NOTE — PROGRESS NOTES
Secondary to computer clich, and inability of several employees to fix problem of accessing prenatal note, a progress note is written here to acknowledge and document on visit on 5/9/18!!  US - FHT's 140bpm Good FM, Anterior placenta, MVP 5.6, BISHNU 12.9 BPP 8/8 BISHNU increased since previous scan- Continue with increased hydration  Anemia- CBC today  CX- Vertex/closed/50%/-2- Labor precautions, Kick counts

## 2018-05-16 ENCOUNTER — ANESTHESIA (OUTPATIENT)
Dept: OBSTETRICS AND GYNECOLOGY | Facility: HOSPITAL | Age: 23
End: 2018-05-16
Payer: MEDICAID

## 2018-05-16 ENCOUNTER — HOSPITAL ENCOUNTER (INPATIENT)
Facility: HOSPITAL | Age: 23
LOS: 2 days | Discharge: HOME | End: 2018-05-18
Attending: OBSTETRICS & GYNECOLOGY | Admitting: OBSTETRICS & GYNECOLOGY
Payer: MEDICAID

## 2018-05-16 ENCOUNTER — ANESTHESIA EVENT (OUTPATIENT)
Dept: OBSTETRICS AND GYNECOLOGY | Facility: HOSPITAL | Age: 23
End: 2018-05-16
Payer: MEDICAID

## 2018-05-16 DIAGNOSIS — R10.9 ABDOMINAL PAIN: ICD-10-CM

## 2018-05-16 DIAGNOSIS — R10.9 ABDOMINAL PAIN AFFECTING PREGNANCY: ICD-10-CM

## 2018-05-16 DIAGNOSIS — O26.899 ABDOMINAL PAIN AFFECTING PREGNANCY: ICD-10-CM

## 2018-05-16 DIAGNOSIS — Z37.9 NORMAL LABOR: ICD-10-CM

## 2018-05-16 LAB
ABO + RH BLD: NORMAL
BASOPHILS # BLD AUTO: 0.02 K/UL
BASOPHILS NFR BLD: 0.2 %
BLD GP AB SCN CELLS X3 SERPL QL: NORMAL
DIFFERENTIAL METHOD: ABNORMAL
EOSINOPHIL # BLD AUTO: 0.1 K/UL
EOSINOPHIL NFR BLD: 0.5 %
ERYTHROCYTE [DISTWIDTH] IN BLOOD BY AUTOMATED COUNT: 14.4 %
HCT VFR BLD AUTO: 29.7 %
HGB BLD-MCNC: 9.4 G/DL
LYMPHOCYTES # BLD AUTO: 2.4 K/UL
LYMPHOCYTES NFR BLD: 19.4 %
MCH RBC QN AUTO: 26.5 PG
MCHC RBC AUTO-ENTMCNC: 31.6 G/DL
MCV RBC AUTO: 84 FL
MONOCYTES # BLD AUTO: 1 K/UL
MONOCYTES NFR BLD: 8.1 %
NEUTROPHILS # BLD AUTO: 8.7 K/UL
NEUTROPHILS NFR BLD: 71.8 %
PLATELET # BLD AUTO: 223 K/UL
PMV BLD AUTO: 11.4 FL
RBC # BLD AUTO: 3.55 M/UL
WBC # BLD AUTO: 12.09 K/UL

## 2018-05-16 PROCEDURE — 59409 OBSTETRICAL CARE: CPT | Mod: GB,,, | Performed by: ADVANCED PRACTICE MIDWIFE

## 2018-05-16 PROCEDURE — 11000001 HC ACUTE MED/SURG PRIVATE ROOM

## 2018-05-16 PROCEDURE — 63600175 PHARM REV CODE 636 W HCPCS: Performed by: NURSE ANESTHETIST, CERTIFIED REGISTERED

## 2018-05-16 PROCEDURE — 0HQ9XZZ REPAIR PERINEUM SKIN, EXTERNAL APPROACH: ICD-10-PCS | Performed by: ADVANCED PRACTICE MIDWIFE

## 2018-05-16 PROCEDURE — 72100003 HC LABOR CARE, EA. ADDL. 8 HRS

## 2018-05-16 PROCEDURE — 86901 BLOOD TYPING SEROLOGIC RH(D): CPT

## 2018-05-16 PROCEDURE — 72100002 HC LABOR CARE, 1ST 8 HOURS

## 2018-05-16 PROCEDURE — 10907ZC DRAINAGE OF AMNIOTIC FLUID, THERAPEUTIC FROM PRODUCTS OF CONCEPTION, VIA NATURAL OR ARTIFICIAL OPENING: ICD-10-PCS | Performed by: ADVANCED PRACTICE MIDWIFE

## 2018-05-16 PROCEDURE — 25000003 PHARM REV CODE 250: Performed by: ADVANCED PRACTICE MIDWIFE

## 2018-05-16 PROCEDURE — 27800517 HC TRAY,EPIDURAL-CONTINUOUS: Performed by: NURSE ANESTHETIST, CERTIFIED REGISTERED

## 2018-05-16 PROCEDURE — 63600175 PHARM REV CODE 636 W HCPCS: Performed by: ADVANCED PRACTICE MIDWIFE

## 2018-05-16 PROCEDURE — 72200005 HC VAGINAL DELIVERY LEVEL II

## 2018-05-16 PROCEDURE — 62326 NJX INTERLAMINAR LMBR/SAC: CPT | Performed by: ANESTHESIOLOGY

## 2018-05-16 PROCEDURE — 85025 COMPLETE CBC W/AUTO DIFF WBC: CPT

## 2018-05-16 PROCEDURE — 51701 INSERT BLADDER CATHETER: CPT

## 2018-05-16 RX ORDER — HYDROCODONE BITARTRATE AND ACETAMINOPHEN 7.5; 325 MG/1; MG/1
1 TABLET ORAL EVERY 4 HOURS PRN
Status: DISCONTINUED | OUTPATIENT
Start: 2018-05-16 | End: 2018-05-18 | Stop reason: HOSPADM

## 2018-05-16 RX ORDER — SODIUM CHLORIDE, SODIUM LACTATE, POTASSIUM CHLORIDE, CALCIUM CHLORIDE 600; 310; 30; 20 MG/100ML; MG/100ML; MG/100ML; MG/100ML
INJECTION, SOLUTION INTRAVENOUS CONTINUOUS
Status: DISCONTINUED | OUTPATIENT
Start: 2018-05-16 | End: 2018-05-16

## 2018-05-16 RX ORDER — ONDANSETRON 8 MG/1
8 TABLET, ORALLY DISINTEGRATING ORAL EVERY 8 HOURS PRN
Status: DISCONTINUED | OUTPATIENT
Start: 2018-05-16 | End: 2018-05-16

## 2018-05-16 RX ORDER — DOCUSATE SODIUM 100 MG/1
100 CAPSULE, LIQUID FILLED ORAL DAILY
Status: DISCONTINUED | OUTPATIENT
Start: 2018-05-17 | End: 2018-05-18 | Stop reason: HOSPADM

## 2018-05-16 RX ORDER — ROPIVACAINE HYDROCHLORIDE 2 MG/ML
INJECTION, SOLUTION EPIDURAL; INFILTRATION; PERINEURAL CONTINUOUS PRN
Status: DISCONTINUED | OUTPATIENT
Start: 2018-05-16 | End: 2018-05-16

## 2018-05-16 RX ORDER — LIDOCAINE HYDROCHLORIDE AND EPINEPHRINE 15; 5 MG/ML; UG/ML
INJECTION, SOLUTION EPIDURAL
Status: DISCONTINUED | OUTPATIENT
Start: 2018-05-16 | End: 2018-05-16

## 2018-05-16 RX ORDER — IBUPROFEN 600 MG/1
600 TABLET ORAL EVERY 6 HOURS
Status: DISCONTINUED | OUTPATIENT
Start: 2018-05-16 | End: 2018-05-18 | Stop reason: HOSPADM

## 2018-05-16 RX ORDER — ACETAMINOPHEN 325 MG/1
650 TABLET ORAL EVERY 6 HOURS PRN
Status: DISCONTINUED | OUTPATIENT
Start: 2018-05-16 | End: 2018-05-18 | Stop reason: HOSPADM

## 2018-05-16 RX ORDER — DIPHENHYDRAMINE HCL 25 MG
25 CAPSULE ORAL EVERY 4 HOURS PRN
Status: DISCONTINUED | OUTPATIENT
Start: 2018-05-16 | End: 2018-05-18 | Stop reason: HOSPADM

## 2018-05-16 RX ORDER — ONDANSETRON 8 MG/1
8 TABLET, ORALLY DISINTEGRATING ORAL EVERY 8 HOURS PRN
Status: DISCONTINUED | OUTPATIENT
Start: 2018-05-16 | End: 2018-05-16 | Stop reason: SDUPTHER

## 2018-05-16 RX ORDER — ROPIVACAINE HYDROCHLORIDE 2 MG/ML
INJECTION, SOLUTION EPIDURAL; INFILTRATION; PERINEURAL
Status: DISCONTINUED | OUTPATIENT
Start: 2018-05-16 | End: 2018-05-16

## 2018-05-16 RX ORDER — ACETAMINOPHEN 500 MG
500 TABLET ORAL EVERY 6 HOURS PRN
Status: DISCONTINUED | OUTPATIENT
Start: 2018-05-16 | End: 2018-05-16

## 2018-05-16 RX ORDER — ONDANSETRON 8 MG/1
8 TABLET, ORALLY DISINTEGRATING ORAL EVERY 8 HOURS PRN
Status: DISCONTINUED | OUTPATIENT
Start: 2018-05-16 | End: 2018-05-18 | Stop reason: HOSPADM

## 2018-05-16 RX ORDER — OXYTOCIN/RINGER'S LACTATE 20/1000 ML
333 PLASTIC BAG, INJECTION (ML) INTRAVENOUS CONTINUOUS
Status: DISPENSED | OUTPATIENT
Start: 2018-05-16 | End: 2018-05-17

## 2018-05-16 RX ORDER — OXYTOCIN/RINGER'S LACTATE 20/1000 ML
2 PLASTIC BAG, INJECTION (ML) INTRAVENOUS CONTINUOUS
Status: DISCONTINUED | OUTPATIENT
Start: 2018-05-16 | End: 2018-05-16

## 2018-05-16 RX ORDER — AMMONIA 15 % (W/V)
0.3 AMPUL (EA) INHALATION CONTINUOUS PRN
Status: DISCONTINUED | OUTPATIENT
Start: 2018-05-16 | End: 2018-05-18 | Stop reason: HOSPADM

## 2018-05-16 RX ORDER — HYDROCODONE BITARTRATE AND ACETAMINOPHEN 5; 325 MG/1; MG/1
1 TABLET ORAL EVERY 4 HOURS PRN
Status: DISCONTINUED | OUTPATIENT
Start: 2018-05-16 | End: 2018-05-18 | Stop reason: HOSPADM

## 2018-05-16 RX ORDER — SODIUM CHLORIDE 9 MG/ML
INJECTION, SOLUTION INTRAVENOUS
Status: DISCONTINUED | OUTPATIENT
Start: 2018-05-16 | End: 2018-05-16

## 2018-05-16 RX ORDER — ROPIVACAINE IN 0.9% SOD CHL/PF 0.2 %
PLASTIC BAG, INJECTION (ML) EPIDURAL CONTINUOUS
Status: DISCONTINUED | OUTPATIENT
Start: 2018-05-16 | End: 2018-05-18 | Stop reason: HOSPADM

## 2018-05-16 RX ADMIN — ROPIVACAINE HYDROCHLORIDE 6 ML: 2 INJECTION, SOLUTION EPIDURAL; INFILTRATION at 10:05

## 2018-05-16 RX ADMIN — ROPIVACAINE HYDROCHLORIDE 5 ML: 2 INJECTION, SOLUTION EPIDURAL; INFILTRATION at 10:05

## 2018-05-16 RX ADMIN — IBUPROFEN 600 MG: 600 TABLET ORAL at 07:05

## 2018-05-16 RX ADMIN — SODIUM CHLORIDE, SODIUM LACTATE, POTASSIUM CHLORIDE, AND CALCIUM CHLORIDE: .6; .31; .03; .02 INJECTION, SOLUTION INTRAVENOUS at 10:05

## 2018-05-16 RX ADMIN — ROPIVACAINE HYDROCHLORIDE 10 ML: 2 INJECTION, SOLUTION EPIDURAL; INFILTRATION at 10:05

## 2018-05-16 RX ADMIN — ROPIVACAINE HYDROCHLORIDE 10 ML/HR: 2 INJECTION, SOLUTION EPIDURAL; INFILTRATION at 10:05

## 2018-05-16 RX ADMIN — Medication 2 MILLI-UNITS/MIN: at 12:05

## 2018-05-16 RX ADMIN — HYDROCODONE BITARTRATE AND ACETAMINOPHEN 1 TABLET: 7.5; 325 TABLET ORAL at 09:05

## 2018-05-16 RX ADMIN — SODIUM CHLORIDE, SODIUM LACTATE, POTASSIUM CHLORIDE, AND CALCIUM CHLORIDE 1000 ML: .6; .31; .03; .02 INJECTION, SOLUTION INTRAVENOUS at 09:05

## 2018-05-16 NOTE — SUBJECTIVE & OBJECTIVE
Obstetric HPI:  Patient reports  Contractions Q 3-4 SINCE 1100 AM today, active fetal movement, No vaginal bleeding , No loss of fluid         Obstetric History       T0      L2     SAB0   TAB0   Ectopic0   Multiple0   Live Births0       # Outcome Date GA Lbr Gennaro/2nd Weight Sex Delivery Anes PTL Lv   3 Current            2             1                  Past Medical History:   Diagnosis Date    Abnormal Pap smear of cervix      History reviewed. No pertinent surgical history.    PTA Medications   Medication Sig    ferrous sulfate 325 mg (65 mg iron) Tab tablet Take 1 tablet (325 mg total) by mouth 2 (two) times daily.    metroNIDAZOLE (FLAGYL) 250 MG tablet Take 2 tablets (500 mg total) by mouth every 12 (twelve) hours.    pantoprazole (PROTONIX) 20 MG tablet Take 1 tablet (20 mg total) by mouth once daily.    PNV,calcium 72-iron-folic acid 27 mg iron- 1 mg Tab Take 1 tablet (1 each total) by mouth once daily.       Review of patient's allergies indicates:  No Known Allergies     Family History     None        Social History Main Topics    Smoking status: Never Smoker    Smokeless tobacco: Never Used    Alcohol use No    Drug use: No    Sexual activity: Yes     Partners: Male     Review of Systems   Gastrointestinal: Positive for abdominal pain.   All other systems reviewed and are negative.     Objective:     Vital Signs (Most Recent):    Vital Signs (24h Range):           There is no height or weight on file to calculate BMI.    FHT: 140Cat 1 (reassuring)  TOCO:  Q 3-4 minutes    Physical Exam:   Constitutional: She is oriented to person, place, and time. She appears well-developed and well-nourished.    HENT:   Head: Normocephalic.    Eyes: EOM are normal. Pupils are equal, round, and reactive to light.    Neck: Normal range of motion.    Cardiovascular: Normal rate.     Pulmonary/Chest: Effort normal.        Abdominal: Soft.     Genitourinary: Vagina normal and uterus  normal.           Musculoskeletal: Normal range of motion.       Neurological: She is alert and oriented to person, place, and time.    Skin: Skin is warm and dry.    Psychiatric: She has a normal mood and affect. Her behavior is normal. Judgment and thought content normal.       Cervix:per rn   Dilation:  3  Effacement:  60  Station: -3  Presentation:      Significant Labs:  Lab Results   Component Value Date    GROUPTRH O POS 10/23/2017    HEPBSAG Negative 10/23/2017    STREPBCULT No Group B Streptococcus isolated 04/18/2018

## 2018-05-16 NOTE — PROGRESS NOTES
S: pt comfortable with epidural  O:  VS reviewed, afebrile   Vitals:    18 1216 18 1231 18 1246 18 1301   BP: (!) 97/49 (!) 102/53 100/61 (!) 104/52   Pulse: 68 72 82 65   Resp:       Temp:    98.2 °F (36.8 °C)   TempSrc:    Oral   SpO2:       Weight:       Height:           FHTs Cat 2, moderate variability with repetitive variable decels  UC Q 4-6 min IUPC inserted for amnioinfusion  SVE /v/-2    A: IUP @ 39w3d ;     Patient Active Problem List   Diagnosis    History of  delivery, currently pregnant in third trimester    Anemia    Rubella non-immune status, antepartum    High risk HPV infection    Cervical shortening in second trimester-  29 mm    Anemia during pregnancy in third trimester    Normal labor       P: amnioinfusion, 300cc bolus then 150cc/hr  Continue close monitoring of maternal/fetal status

## 2018-05-16 NOTE — L&D DELIVERY NOTE
[facility]  Vaginal Delivery   Operative Note    SUMMARY     Normal spontaneous vaginal delivery of live infant, was placed on mothers abdomen for skin to skin and bulb suctioning performed.  Infant delivered position NICOLÁS over intact perineum.  Nuchal cord: Yes, cord reduced at perineum. X 2    Spontaneous delivery of placenta and IV pitocin given noting good uterine tone.  1st degree laceration noted bilateral periurethral repaired with 2 interrupted 3-0 vicryl.  Patient tolerated delivery well. Sponge needle and lap counted correctly x2.    Indications: Normal labor  Pregnancy complicated by:   Patient Active Problem List   Diagnosis    History of  delivery, currently pregnant in third trimester    Anemia    Rubella non-immune status, antepartum    High risk HPV infection    Cervical shortening in second trimester-  29 mm    Anemia during pregnancy in third trimester    Normal labor    First degree perineal laceration during delivery    Delivery outcome of single liveborn infant     Admitting GA: 39w3d    Delivery Information for  Ryley Bledsoe    Birth information:  YOB: 2019   Time of birth: 4:44 PM   Sex: male   Head Delivery Date/Time: 2018  4:43 PM   Delivery type: Vaginal, Spontaneous Delivery   Gestational Age: 39w3d    Delivery Providers    Delivering clinician:  Jenelle Josue CNM   Provider Role    Mary Webb RN Registered Nurse    Lydia Cancino RN Registered Nurse    Mary Gupta Medical Student                 Assessment    Living status:  Living  Apgars:     1 Minute:   5 Minute:   10 Minute:   15 Minute:   20 Minute:     Skin Color:   1  1       Heart Rate:   2  2       Reflex Irritability:   2  2       Muscle Tone:   2  2       Respiratory Effort:   2  2       Total:   9  9               Apgars Assigned By:  LYDIA CANCINO RN         Assisted Delivery Details:    Forceps attempted?:  No  Vacuum extractor attempted?:   No         Shoulder Dystocia    Shoulder dystocia present?:  No           Presentation and Position    Presentation:  Vertex  Position:  Occiput Anterior           Interventions/Resuscitation    Method:  Bulb Suctioning, Tactile Stimulation       Cord    Vessels:  3 vessels  Complications:  Nuchal  Nuchal Intervention:  reduced  Nuchal Cord Description:  loose nuchal cord  Number of Loops:  2  Delayed Cord Clamping?:  Yes  Cord Clamped Date/Time:  2018  4:47 PM  Cord Blood Disposition:  Lab  Gases Sent?:  No  Stem Cell Collection (by MD):  No       Placenta    Date and time:  2018  4:52 PM           Labor Events:       labor: No     Labor Onset Date/Time:         Dilation Complete Date/Time:         Start Pushing Date/Time:       Rupture Date/Time:              Rupture type:           Fluid Amount:        Fluid Color:        Fluid Odor:        Membrane Status (PeriCalm): ARM (Artificial Rupture)      Rupture Date/Time (PeriCalm): 2018 10:40:00      Fluid Amount (PeriCalm): Moderate      Fluid Color (PeriCalm): Clear       steroids: None     Antibiotics given for GBS: No     Induction:       Indications for induction:        Augmentation: amniotomy;oxytocin     Indications for augmentation:       Labor complications: None     Additional complications:          Cervical ripening:                     Delivery:      Episiotomy: None     Indication for Episiotomy:       Perineal Lacerations: None Repaired:      Periurethral Laceration: bilateral Repaired: Yes   Labial Laceration: none Repaired:     Sulcus Laceration: none Repaired:     Vaginal Laceration: No Repaired:     Cervical Laceration: No Repaired:     Repair suture:       Repair # of packets: 1     Vaginal delivery QBL (mL): 100      QBL (mL): 0     Combined Blood Loss (mL): 100     Vaginal Sweep Performed: No     Surgicount Correct: Yes       Other providers:       Anesthesia    Method:  Epidural          Details  (if applicable):  Trial of Labor      Categorization:      Priority:     Indications for :     Incision Type:       Additional  information:  Forceps:    Vacuum:    Breech:    Observed anomalies    Other (Comments):

## 2018-05-16 NOTE — PROGRESS NOTES
S: pt states UC have intensified  O:  VS reviewed, afebrile   Vitals:    18 0612 18 0700 18 0921 18 0931   BP:  118/70 113/69 113/65   Pulse:  87 78 73   Resp: (!) 22 18     Temp:  98.5 °F (36.9 °C)     TempSrc:  Oral     SpO2:       Weight:       Height:           FHTs Cat 1   UC Q 3-5   SVE 4-5 cm/70/v/-2 per RN    A: IUP @ 39w3d ;     Patient Active Problem List   Diagnosis    History of  delivery, currently pregnant in third trimester    Anemia    Rubella non-immune status, antepartum    High risk HPV infection    Cervical shortening in second trimester-  29 mm    Anemia during pregnancy in third trimester    Abdominal pain affecting pregnancy    Abdominal pain       P:  Will change to inpatient status, epidural when pt desires

## 2018-05-16 NOTE — PROGRESS NOTES
"Discussed feeding choice with mother.  Reviewed benefits of breastfeeding.  Patient given "What to Expect in the First 48 Hours" handout. Mother states her intention is breastfeeding. Coffective counseling sheet Fall in Love discussed with mother. Reinforced immediate skin to skin, the magic first hour, importance of the first feeding and delaying routine procedures. Encouraged mother to download Coffective mobile ulises if she has not already done so. Mother verbalies understanding.  "

## 2018-05-16 NOTE — TRANSFER OF CARE
"Anesthesia Transfer of Care Note    Patient: Mattie Bledsoe    Procedure(s) Performed: * No procedures listed *    Patient location: Labor and Delivery    Anesthesia Type: epidural    Post pain: adequate analgesia    Post assessment: no apparent anesthetic complications    Post vital signs: stable    Level of consciousness: awake, oriented and alert    Nausea/Vomiting: no nausea/vomiting    Complications: none    Transfer of care protocol was followed      Last vitals:   Visit Vitals  /63   Pulse 84   Temp 37.3 °C (99.2 °F) (Tympanic)   Resp 18   Ht 5' 2" (1.575 m)   Wt 78 kg (172 lb)   LMP 08/13/2017   SpO2 100%   Breastfeeding? No   BMI 31.46 kg/m²     "

## 2018-05-16 NOTE — SUBJECTIVE & OBJECTIVE
Interval History:  Mattie is a 22 y.o.  at 39w3d. She is doing well, epidural just placed    Objective:     Vital Signs (Most Recent):  Temp: 98.5 °F (36.9 °C) (18 0700)  Pulse: 73 (18 0931)  Resp: 18 (18 0700)  BP: 113/65 (18 0931)  SpO2: 100 % (18 0228) Vital Signs (24h Range):  Temp:  [98.5 °F (36.9 °C)-99.4 °F (37.4 °C)] 98.5 °F (36.9 °C)  Pulse:  [] 73  Resp:  [18-22] 18  SpO2:  [98 %-100 %] 100 %  BP: (113-132)/(65-76) 113/65     Weight: 78 kg (172 lb)  Body mass index is 31.46 kg/m².    FHT: Cat 1 (reassuring)  TOCO:  Q 7-8 minutes    Cervical Exam:  Dilation:  5  AROM clear fluid  Effacement:  70  Station: -2  Presentation: Vertex     Significant Labs:  Lab Results   Component Value Date    GROUPTRH O POS 2018    HEPBSAG Negative 10/23/2017    STREPBCULT No Group B Streptococcus isolated 2018       I have personallly reviewed all pertinent lab results from the last 24 hours.    Physical Exam

## 2018-05-16 NOTE — ANESTHESIA POSTPROCEDURE EVALUATION
"Anesthesia Post Evaluation    Patient: Mattie Bledsoe    Procedure(s) Performed: * No procedures listed *    Final Anesthesia Type: epidural  Patient location during evaluation: labor & delivery  Patient participation: Yes- Able to Participate  Level of consciousness: awake and alert  Post-procedure vital signs: reviewed and stable  Pain management: adequate  Airway patency: patent  PONV status at discharge: No PONV  Anesthetic complications: no      Cardiovascular status: blood pressure returned to baseline  Respiratory status: unassisted, spontaneous ventilation and room air  Hydration status: euvolemic  Follow-up not needed.        Visit Vitals  /63   Pulse 84   Temp 37.3 °C (99.2 °F) (Tympanic)   Resp 18   Ht 5' 2" (1.575 m)   Wt 78 kg (172 lb)   LMP 08/13/2017   SpO2 100%   Breastfeeding? No   BMI 31.46 kg/m²       Pain/Jesse Score: No Data Recorded      "

## 2018-05-16 NOTE — ANESTHESIA PROCEDURE NOTES
Epidural    Patient location during procedure: OB   Reason for block: primary anesthetic   Diagnosis: IUP with labor pain   Start time: 5/16/2018 10:01 AM  Timeout: 5/16/2018 9:58 AM  End time: 5/16/2018 10:06 AM  Staffing  Anesthesiologist: SABRINA CARDOSO  Resident/CRNA: SREEKANTH BRUSH  Performed: anesthesiologist   Preanesthetic Checklist  Completed: patient identified, pre-op evaluation, timeout performed, IV checked, risks and benefits discussed, monitors and equipment checked, anesthesia consent given, hand hygiene performed and patient being monitored  Preparation  Patient position: sitting  Prep: Betadine  Patient monitoring: Pulse Ox and Blood Pressure  Epidural  Skin Anesthetic: lidocaine 1%  Skin Wheal: 3 mL  Administration type: continuous  Approach: midline  Interspace: L3-4  Injection technique: ARNALDO saline and ARNALDO air  Needle and Epidural Catheter  Needle type: Tuohy   Needle gauge: 18  Needle length: 3.5 inches  Needle insertion depth: 5 cm  Catheter type: multi-orifice  Catheter size: 20 G  Catheter at skin depth: 9 cm  Test dose: 3 mL of lidocaine 1.5% with Epi 1-to-200,000  Additional Documentation: incremental injection, negative aspiration for heme and CSF, no signs/symptoms of IV or SA injection, no paresthesia on injection, no significant pain on injection and no significant complaints from patient  Needle localization: anatomical landmarks  Medications:  Bolus administered: 11 mL of 0.2% ropivacaine  Epinephrine added: none  Assessment  Upper dermatomal levels - Left: T7  Right: T7   Dermatomal levels determined by pinch or prick  Ease of block: easy  Patient's tolerance of the procedure: comfortable throughout block and no complaints

## 2018-05-16 NOTE — PROGRESS NOTES
[facility]  Obstetrics  Labor Progress Note    Patient Name: Mattie Bledsoe  MRN: [id number]  Admission Date: 2018  Hospital Length of Stay: 0 days  Attending Physician: Katie Ferris MD  Primary Care Provider: Primary Doctor No    Subjective:     Principal Problem:Normal labor    Hospital Course:  EFM, NST   0730 + cx change admitted in labor    Interval History:  Mattie is a 22 y.o.  at 39w3d. She is doing well, epidural just placed    Objective:     Vital Signs (Most Recent):  Temp: 98.5 °F (36.9 °C) (18 0700)  Pulse: 73 (18 0931)  Resp: 18 (18 0700)  BP: 113/65 (18 0931)  SpO2: 100 % (18 0228) Vital Signs (24h Range):  Temp:  [98.5 °F (36.9 °C)-99.4 °F (37.4 °C)] 98.5 °F (36.9 °C)  Pulse:  [] 73  Resp:  [18-22] 18  SpO2:  [98 %-100 %] 100 %  BP: (113-132)/(65-76) 113/65     Weight: 78 kg (172 lb)  Body mass index is 31.46 kg/m².    FHT: Cat 1 (reassuring)  TOCO:  Q 7-8 minutes    Cervical Exam:  Dilation:  5  AROM clear fluid  Effacement:  70  Station: -2  Presentation: Vertex     Significant Labs:  Lab Results   Component Value Date    GROUPTRH O POS 2018    HEPBSAG Negative 10/23/2017    STREPBCULT No Group B Streptococcus isolated 2018       I have personallly reviewed all pertinent lab results from the last 24 hours.    Physical Exam    Assessment/Plan:     22 y.o. female  at 39w3d for:    * Normal labor    Admitted in labor, epidural in place, AROM  Pitocin augmentation if needed              Jenelle Josue CNM  Obstetrics  [facility]

## 2018-05-16 NOTE — HOSPITAL COURSE
EFM, NST   0730 + cx change admitted in labor  18   2018 Routine pp care  18 D/C for home care , continue routine PP care

## 2018-05-16 NOTE — ANESTHESIA PREPROCEDURE EVALUATION
2018  Mattie Bledsoe is a 22 y.o., female.    Pre-op Assessment    I have reviewed the Patient Summary Reports.     I have reviewed the Nursing Notes.   I have reviewed the Medications.     Review of Systems  Anesthesia Hx:  No problems with previous Anesthesia Epidural for previous vag delivery Neg history of prior surgery. Denies Family Hx of Anesthesia complications.   Denies Personal Hx of Anesthesia complications.   Social:  Non-Smoker, No Alcohol Use    Hematology/Oncology:     Oncology Normal    -- Anemia: Hematology Comments: Todays H/H-    EENT/Dental:EENT/Dental Normal   Cardiovascular:  Cardiovascular Normal     Pulmonary:  Pulmonary Normal    Renal/:  Renal/ Normal     Hepatic/GI:  Hepatic/GI Normal    Musculoskeletal:  Musculoskeletal Normal    OB/GYN/PEDS:  39 wk  with DILIP and vag delivery    Neurological:  Neurology Normal    Endocrine:  Endocrine Normal    Dermatological:  Skin Normal    Psych:  Psychiatric Normal           Physical Exam  General:  Well nourished    Airway/Jaw/Neck:  Airway Findings: Mouth Opening: Normal Tongue: Normal  General Airway Assessment: Adult  Mallampati: III  Improves to III with phonation.  TM Distance: Normal, at least 6 cm       Chest/Lungs:  Chest/Lungs Findings: Normal Respiratory Rate     Heart/Vascular:  Heart Findings: Rate: Normal        Mental Status:  Mental Status Findings:  Alert and Oriented, Cooperative         Anesthesia Plan  Type of Anesthesia, risks & benefits discussed:  Anesthesia Type:  epidural  Patient's Preference:   Intra-op Monitoring Plan: standard ASA monitors  Intra-op Monitoring Plan Comments:   Post Op Pain Control Plan:   Post Op Pain Control Plan Comments:   Induction:    Beta Blocker:  Patient is not currently on a Beta-Blocker (No further documentation required).       Informed Consent: Patient understands  risks and agrees with Anesthesia plan.  Questions answered. Anesthesia consent signed with patient.  ASA Score: 2     Day of Surgery Review of History & Physical: I have interviewed and examined the patient. I have reviewed the patient's H&P dated:

## 2018-05-16 NOTE — ANESTHESIA RELEASE NOTE
"Anesthesia Release from PACU Note    Patient: Mattie Bledsoe    Procedure(s) Performed: * No procedures listed *    Anesthesia type: epidural    Post pain: Adequate analgesia    Post assessment: no apparent anesthetic complications and tolerated procedure well    Last Vitals:   Visit Vitals  /63   Pulse 84   Temp 37.3 °C (99.2 °F) (Tympanic)   Resp 18   Ht 5' 2" (1.575 m)   Wt 78 kg (172 lb)   LMP 08/13/2017   SpO2 100%   Breastfeeding? No   BMI 31.46 kg/m²       Post vital signs: stable    Level of consciousness: awake, alert  and oriented    Nausea/Vomiting: no nausea/no vomiting    Complications: none    Airway Patency: patent    Respiratory: unassisted, spontaneous ventilation, room air    Cardiovascular: stable and blood pressure at baseline    Hydration: euvolemic  "

## 2018-05-16 NOTE — H&P
[facility] -   Obstetrics  History & Physical    Patient Name: Mattie Bledsoe  MRN: [id number]  Admission Date: 2018  Primary Care Provider: Primary Doctor No    Subjective:     Principal Problem:Abdominal pain affecting pregnancy    History of Present Illness:   AT 39+3 Co ctx     Obstetric HPI:  Patient reports  Contractions Q 3-4 SINCE 1100 AM today, active fetal movement, No vaginal bleeding , No loss of fluid         Obstetric History       T0      L2     SAB0   TAB0   Ectopic0   Multiple0   Live Births0       # Outcome Date GA Lbr Gennaro/2nd Weight Sex Delivery Anes PTL Lv   3 Current            2             1                  Past Medical History:   Diagnosis Date    Abnormal Pap smear of cervix      History reviewed. No pertinent surgical history.    PTA Medications   Medication Sig    ferrous sulfate 325 mg (65 mg iron) Tab tablet Take 1 tablet (325 mg total) by mouth 2 (two) times daily.    metroNIDAZOLE (FLAGYL) 250 MG tablet Take 2 tablets (500 mg total) by mouth every 12 (twelve) hours.    pantoprazole (PROTONIX) 20 MG tablet Take 1 tablet (20 mg total) by mouth once daily.    PNV,calcium 72-iron-folic acid 27 mg iron- 1 mg Tab Take 1 tablet (1 each total) by mouth once daily.       Review of patient's allergies indicates:  No Known Allergies     Family History     None        Social History Main Topics    Smoking status: Never Smoker    Smokeless tobacco: Never Used    Alcohol use No    Drug use: No    Sexual activity: Yes     Partners: Male     Review of Systems   Gastrointestinal: Positive for abdominal pain.   All other systems reviewed and are negative.     Objective:     Vital Signs (Most Recent):    Vital Signs (24h Range):           There is no height or weight on file to calculate BMI.    FHT: 140Cat 1 (reassuring)  TOCO:  Q 3-4 minutes    Physical Exam:   Constitutional: She is oriented to person, place, and time. She appears  well-developed and well-nourished.    HENT:   Head: Normocephalic.    Eyes: EOM are normal. Pupils are equal, round, and reactive to light.    Neck: Normal range of motion.    Cardiovascular: Normal rate.     Pulmonary/Chest: Effort normal.        Abdominal: Soft.     Genitourinary: Vagina normal and uterus normal.           Musculoskeletal: Normal range of motion.       Neurological: She is alert and oriented to person, place, and time.    Skin: Skin is warm and dry.    Psychiatric: She has a normal mood and affect. Her behavior is normal. Judgment and thought content normal.       Cervix:per rn   Dilation:  3  Effacement:  60  Station: -3  Presentation:      Significant Labs:  Lab Results   Component Value Date    GROUPTRH O POS 10/23/2017    HEPBSAG Negative 10/23/2017    STREPBCULT No Group B Streptococcus isolated 2018           Assessment/Plan:     22 y.o. female  at 39w3d for:    * Abdominal pain affecting pregnancy    efm nst             Che Gonzalez, SINDI  Obstetrics  [facility]

## 2018-05-16 NOTE — NURSING
Pt educated on pumping nipple stimulation; 20 min bilaterally, then ambulate. Pt told to stop if abdomen stays tight and does not relax, if nipples get sore. Pt prefers pumping method over pitocin. Pt verbalized understanding, and pumping independently.

## 2018-05-17 PROBLEM — Z37.9 NORMAL LABOR: Status: RESOLVED | Noted: 2018-05-16 | Resolved: 2018-05-17

## 2018-05-17 PROBLEM — O09.893 HISTORY OF PRETERM DELIVERY, CURRENTLY PREGNANT IN THIRD TRIMESTER: Status: RESOLVED | Noted: 2017-10-03 | Resolved: 2018-05-17

## 2018-05-17 PROBLEM — O26.872 CERVICAL SHORTENING IN SECOND TRIMESTER: Status: RESOLVED | Noted: 2018-01-23 | Resolved: 2018-05-17

## 2018-05-17 PROBLEM — D64.9 ANEMIA: Status: RESOLVED | Noted: 2017-11-27 | Resolved: 2018-05-17

## 2018-05-17 PROCEDURE — 11000001 HC ACUTE MED/SURG PRIVATE ROOM

## 2018-05-17 PROCEDURE — 99231 SBSQ HOSP IP/OBS SF/LOW 25: CPT | Mod: ,,, | Performed by: ADVANCED PRACTICE MIDWIFE

## 2018-05-17 PROCEDURE — 25000003 PHARM REV CODE 250: Performed by: ADVANCED PRACTICE MIDWIFE

## 2018-05-17 RX ADMIN — HYDROCODONE BITARTRATE AND ACETAMINOPHEN 1 TABLET: 7.5; 325 TABLET ORAL at 02:05

## 2018-05-17 RX ADMIN — DOCUSATE SODIUM 100 MG: 100 CAPSULE, LIQUID FILLED ORAL at 09:05

## 2018-05-17 RX ADMIN — IBUPROFEN 600 MG: 600 TABLET ORAL at 11:05

## 2018-05-17 RX ADMIN — HYDROCODONE BITARTRATE AND ACETAMINOPHEN 1 TABLET: 7.5; 325 TABLET ORAL at 05:05

## 2018-05-17 RX ADMIN — IBUPROFEN 600 MG: 600 TABLET ORAL at 12:05

## 2018-05-17 RX ADMIN — IBUPROFEN 600 MG: 600 TABLET ORAL at 06:05

## 2018-05-17 RX ADMIN — IBUPROFEN 600 MG: 600 TABLET ORAL at 05:05

## 2018-05-17 NOTE — PLAN OF CARE
Problem: Patient Care Overview  Goal: Plan of Care Review  Outcome: Ongoing (interventions implemented as appropriate)  Pt progressing well since transfer from L&D. Up ad lukas and voiding without difficulty. Bleeding is light. Pain controlled with po meds. Vitals stable. Bonding with baby. Breastfeeding. No issues at this time. Will continue to monitor.

## 2018-05-17 NOTE — PLAN OF CARE
Problem: Patient Care Overview  Goal: Plan of Care Review  Outcome: Ongoing (interventions implemented as appropriate)   @ 1644, breastfeeding, mother bonding well with baby, vital signs stable, bleeding stable, will continue to monitor

## 2018-05-17 NOTE — PROGRESS NOTES
[facility] -   Obstetrics  Postpartum Progress Note    Patient Name: Mattie Bledsoe  MRN: [id number]  Admission Date: 2018  Hospital Length of Stay: 1 days  Attending Physician: Katie Ferris MD  Primary Care Provider: Primary Doctor No    Subjective:     Principal Problem: (normal spontaneous vaginal delivery)    Hospital course: EFM, NST   0730 + cx change admitted in labor  18   2018 Routine pp care      Interval History: Doing well, denies problems. Breastfeeding better today. Plans BTL for birth control.     She is doing well this morning. She is tolerating a regular diet without nausea or vomiting. She is voiding spontaneously. She is ambulating. She has passed flatus, and has not a BM. Vaginal bleeding is mild. She denies fever or chills. Abdominal pain is mild and controlled with oral medications. She is breastfeeding. She desires circumcision for her male baby: no.    Objective:     Vital Signs (Most Recent):  Temp: 98.1 °F (36.7 °C) (18 0800)  Pulse: 64 (18 0800)  Resp: 18 (18 0800)  BP: (!) 108/56 (18 0800)  SpO2: 99 % (18 1701) Vital Signs (24h Range):  Temp:  [98.1 °F (36.7 °C)-99.2 °F (37.3 °C)] 98.1 °F (36.7 °C)  Pulse:  [] 64  Resp:  [18] 18  SpO2:  [99 %] 99 %  BP: ()/(42-81) 108/56     Weight: 78 kg (172 lb)  Body mass index is 31.46 kg/m².      Intake/Output Summary (Last 24 hours) at 18 0921  Last data filed at 18 0040   Gross per 24 hour   Intake                0 ml   Output             3000 ml   Net            -3000 ml       Significant Labs:  Lab Results   Component Value Date    GROUPTRH O POS 2018    HEPBSAG Negative 10/23/2017    STREPBCULT No Group B Streptococcus isolated 2018       Recent Labs  Lab 18  0800   HGB 9.4*   HCT 29.7*       I have personallly reviewed all pertinent lab results from the last 24 hours.    Physical Exam:   Constitutional: She is oriented to person,  place, and time. She appears well-developed and well-nourished.    HENT:   Head: Normocephalic and atraumatic.      Cardiovascular: Normal rate and regular rhythm.     Pulmonary/Chest: Effort normal and breath sounds normal.        Abdominal: Soft. Bowel sounds are normal.     Genitourinary: Uterus normal.           Musculoskeletal: Normal range of motion and moves all extremeties.       Neurological: She is alert and oriented to person, place, and time.    Skin: Skin is warm and dry.    Psychiatric: She has a normal mood and affect. Her behavior is normal.       Assessment/Plan:     22 y.o. female  for:    *  (normal spontaneous vaginal delivery)    Routine pp care        Rubella non-immune status, antepartum    MMR pp            Disposition: As patient meets milestones, will plan to discharge tomorrow.    Mary Ferris CNM  Obstetrics  [facility]

## 2018-05-17 NOTE — SUBJECTIVE & OBJECTIVE
Hospital course: HEATHER, NST   0730 + cx change admitted in labor  18   2018 Routine pp care      Interval History: Doing well, denies problems. Breastfeeding better today. Plans BTL for birth control.     She is doing well this morning. She is tolerating a regular diet without nausea or vomiting. She is voiding spontaneously. She is ambulating. She has passed flatus, and has not a BM. Vaginal bleeding is mild. She denies fever or chills. Abdominal pain is mild and controlled with oral medications. She is breastfeeding. She desires circumcision for her male baby: no.    Objective:     Vital Signs (Most Recent):  Temp: 98.1 °F (36.7 °C) (18 0800)  Pulse: 64 (18 0800)  Resp: 18 (18 0800)  BP: (!) 108/56 (18 0800)  SpO2: 99 % (18 1701) Vital Signs (24h Range):  Temp:  [98.1 °F (36.7 °C)-99.2 °F (37.3 °C)] 98.1 °F (36.7 °C)  Pulse:  [] 64  Resp:  [18] 18  SpO2:  [99 %] 99 %  BP: ()/(42-81) 108/56     Weight: 78 kg (172 lb)  Body mass index is 31.46 kg/m².      Intake/Output Summary (Last 24 hours) at 18 0921  Last data filed at 18 0040   Gross per 24 hour   Intake                0 ml   Output             3000 ml   Net            -3000 ml       Significant Labs:  Lab Results   Component Value Date    GROUPTRH O POS 2018    HEPBSAG Negative 10/23/2017    STREPBCULT No Group B Streptococcus isolated 2018       Recent Labs  Lab 18  0800   HGB 9.4*   HCT 29.7*       I have personallly reviewed all pertinent lab results from the last 24 hours.    Physical Exam:   Constitutional: She is oriented to person, place, and time. She appears well-developed and well-nourished.    HENT:   Head: Normocephalic and atraumatic.      Cardiovascular: Normal rate and regular rhythm.     Pulmonary/Chest: Effort normal and breath sounds normal.        Abdominal: Soft. Bowel sounds are normal.     Genitourinary: Uterus normal.           Musculoskeletal: Normal  range of motion and moves all extremeties.       Neurological: She is alert and oriented to person, place, and time.    Skin: Skin is warm and dry.    Psychiatric: She has a normal mood and affect. Her behavior is normal.

## 2018-05-17 NOTE — LACTATION NOTE
"Lactation rounds. Patient reports baby is feeding well. States, "we were having some issues with latch but it has gotten much better." Reviewed what to expect in the early  period, infant feeding/hunger cues, cue based feeding on demand, proper positioning and latch technique, nutritive versus non-nutritive suckling, listening for audible swallows, expected output, uninterrupted skin to skin contact, unrestricted access to breast, and manual expression of milk. Encouraged to avoid artificial nipples and formula supplementation unless medically necessary, and reviewed risks. Encouraged to feed on demand 8 or more times per day and to request assistance as needed. Provided contact number for lactation.  Verbalizes understanding.        "

## 2018-05-18 VITALS
HEART RATE: 80 BPM | DIASTOLIC BLOOD PRESSURE: 63 MMHG | WEIGHT: 172 LBS | RESPIRATION RATE: 18 BRPM | SYSTOLIC BLOOD PRESSURE: 116 MMHG | BODY MASS INDEX: 31.65 KG/M2 | TEMPERATURE: 98 F | OXYGEN SATURATION: 98 % | HEIGHT: 62 IN

## 2018-05-18 PROCEDURE — 25000003 PHARM REV CODE 250: Performed by: ADVANCED PRACTICE MIDWIFE

## 2018-05-18 PROCEDURE — 99238 HOSP IP/OBS DSCHRG MGMT 30/<: CPT | Mod: ,,, | Performed by: ADVANCED PRACTICE MIDWIFE

## 2018-05-18 RX ADMIN — IBUPROFEN 600 MG: 600 TABLET ORAL at 05:05

## 2018-05-18 RX ADMIN — DOCUSATE SODIUM 100 MG: 100 CAPSULE, LIQUID FILLED ORAL at 11:05

## 2018-05-18 RX ADMIN — HYDROCODONE BITARTRATE AND ACETAMINOPHEN 1 TABLET: 7.5; 325 TABLET ORAL at 06:05

## 2018-05-18 RX ADMIN — DOCUSATE SODIUM 100 MG: 100 CAPSULE, LIQUID FILLED ORAL at 08:05

## 2018-05-18 RX ADMIN — IBUPROFEN 600 MG: 600 TABLET ORAL at 12:05

## 2018-05-18 RX ADMIN — IBUPROFEN 600 MG: 600 TABLET ORAL at 11:05

## 2018-05-18 NOTE — PHYSICIAN QUERY
"PT Name: Mattie Bledsoe  MR #: [id number]     Physician Query Form - Documentation Clarification      CDS/: WALESKA Bond,RNC-[email or web address]          Contact information:[email or web address]    This form is a permanent document in the medical record.     Query Date: May 18, 2018    By submitting this query, we are merely seeking further clarification of documentation. Please utilize your independent clinical judgment when addressing the question(s) below.    The Medical record reflects the following:    Supporting Clinical Findings Location in Medical Record   BMI=31.5  Ht=5' 2" (1.575 m)  Wt=78 kg (172 lb)    Normal spontaneous vaginal delivery of live infant Anthropometrics 5/16        L&D Delivery note 5/16                                                                                      Doctor, Please specify diagnosis or diagnoses associated with above clinical findings.    Provider Use Only      [  ] Obesity complicating childbirth  [  ] Overweight complicating childbirth  [  ] Other, please specify:________________________________________                                                                                                                   [ * ] Clinically undetermined            "

## 2018-05-18 NOTE — NURSING
Discharge instructions given to patient.  Verbalized understanding.  D/c'ed per w/c with infant on lap.

## 2018-05-18 NOTE — DISCHARGE INSTRUCTIONS
"Mother Self Care:    Activity: Avoid strenuous exercise and get adequate rest.  No driving until the physician consent given.  Emotional Changes: Most women find birth to be a time of great emotional upheaval.  Sense of loss, mood swings, fatigue, anxiety, and feeling "let down" are common.  If feelings worsen or last more than a week, call your physician.  Breast Care/Breastfeeding: Wear a bra for comfort.  Keep nipples dry and apply your own breast milk or lanolin cream as needed for soreness.  Engorgement can be relieved with warm, moist heat before feedings.  You may also take Ibuprofen.    Tomi-Care/Vaginal Bleeding: Remember to use your tomi-bottle after urinating.  Your flow will change from red, to pink, to yellow/white color over a period of 2 weeks.  Menstruation will return in 3-8 weeks, or longer if breastfeeding.  Episiotomy Vaginal Delivery: Stitches will dissolve within 10 days to 3 weeks.  Warm baths, tucks, and dermoplast will promote healing.  Avoid bubble baths or strong soaps.    Sexual Activity/Pelvic Rest: No sexual activity, tampons, or douching until your physician gives you consent.  Diet: Continue to eat from the five basic food groups, including plenty of protein, fruits, vegetables, and whole grains.  Limit empty calories and high fat foods.  Drink enough fluids to satisfy thirst and add an extra 500 calories for breastfeeding.  Constipation/Hemorrhoids: Drink plenty of water.  You may take a stool softener or natural laxative (Metamucil). You may use tucks or hemorrhoid ointment and soak in a warm tub.    CALL YOUR OB DOCTOR IF ANY OF THE FOLLOWING OCCURS:  *Heavy bleeding - saturating a pad an hour or passing any large (2-3 inches in size) blood clots.  *Any pain, redness, or tenderness in lower leg.  *You cannot care for yourself or your baby.  *Any signs of infection-      - Temperature greater than 100.5 degrees F      - Foul smelling vaginal discharge and/or incisional drainage      " - Increased episiotomy or incisional pain      - Hot, hard, red or sore area on breast      - Flu-like symptoms      - Any urgency, frequency or burning with urination    Return To the Hospital for further Evaluation:  · Headache not relieved by tylenol or ibuprofen  · Blurry vision, double vision, seeing spots, or flashing lights  · Feeling faint or passing out  · Right epigastric pain  · Difficulty breathing  · Swelling in hands, face, or feet  · Any of these symptoms accompanied by nausea/vomiting  · Gaining more than 5 pounds in one week  · Seizures  These symptoms could be an indication of elevated blood pressure.       If you have any questions that need to be answered immediately please call the Labor & Delivery Unit at [phone] and ask to speak to a nurse.

## 2018-05-18 NOTE — DISCHARGE SUMMARY
[facility] -   Obstetrics  Discharge Summary      Patient Name: Mattie Bledsoe  MRN: [id number]  Admission Date: 2018  Hospital Length of Stay: 2 days  Discharge Date and Time:  2018 11:52 AM  Attending Physician: Katie Ferris MD   Discharging Provider: Ana Thorpe CNM  Primary Care Provider: Primary Doctor No    HPI:  AT 39+3 Co ctx     * No surgery found *     Hospital Course:   EFM, NST   0730 + cx change admitted in labor  18   2018 Routine pp care  18 D/C for home care , continue routine PP care        Final Active Diagnoses:    Diagnosis Date Noted POA    PRINCIPAL PROBLEM:   (normal spontaneous vaginal delivery) [O80] 2018 Not Applicable    First degree perineal laceration during delivery [O70.0] 2018 No    Delivery outcome of single liveborn infant [Z37.0] 2018 Not Applicable    Rubella non-immune status, antepartum [O99.89, Z28.3] 2017 Not Applicable      Problems Resolved During this Admission:    Diagnosis Date Noted Date Resolved POA    Normal labor [O80, Z37.9] 2018 Not Applicable    Abdominal pain [R10.9] 2018 Yes        Labs: All labs within the past 24 hours have been reviewed    Feeding Method: breastfeeding    Immunizations     Date Immunization Status Dose Route/Site Given by    18 1140 MMR Deferred 0.5 mL Subcutaneous/Left deltoid Kristy Garduno RN    18 1140 Tdap Deferred (Other) 0.5 mL Intramuscular/Left deltoid Kristy Garduno RN          Delivery:    Episiotomy: None   Lacerations: None   Repair suture:     Repair # of packets: 1   Blood loss (ml): 100     Birth information:  YOB: 2019   Time of birth: 4:44 PM   Sex: male   Delivery type: Vaginal, Spontaneous Delivery   Gestational Age: 39w3d    Delivery Clinician:      Other providers:       Additional  information:  Forceps:    Vacuum:    Breech:    Observed anomalies       Living?:           APGARS  One minute Five minutes Ten minutes   Skin color:         Heart rate:         Grimace:         Muscle tone:         Breathing:         Totals: 9  9        Placenta: Delivered:       appearance    Pending Diagnostic Studies:     None          Discharged Condition: good    Disposition: Home or Self Care    Follow Up:  Follow-up Information     Follow up In 2 weeks.    Why:  BTL preop               Patient Instructions:     Diet Adult Regular     Activity as tolerated     Notify your health care provider if you experience any of the following:  temperature >100.4     Notify your health care provider if you experience any of the following:  persistent nausea and vomiting or diarrhea     Notify your health care provider if you experience any of the following:  severe uncontrolled pain       Medications:  Current Discharge Medication List      CONTINUE these medications which have NOT CHANGED    Details   ferrous sulfate 325 mg (65 mg iron) Tab tablet Take 1 tablet (325 mg total) by mouth 2 (two) times daily.  Qty: 90 tablet, Refills: 2    Associated Diagnoses: Anemia, unspecified type      pantoprazole (PROTONIX) 20 MG tablet Take 1 tablet (20 mg total) by mouth once daily.  Qty: 30 tablet, Refills: 1    Associated Diagnoses: Heartburn during pregnancy in third trimester      PNV,calcium 72-iron-folic acid 27 mg iron- 1 mg Tab Take 1 tablet (1 each total) by mouth once daily.  Qty: 30 tablet, Refills: 11    Comments: May substitute with formulary vitamin covered on plan.         STOP taking these medications       metroNIDAZOLE (FLAGYL) 250 MG tablet Comments:   Reason for Stopping:               Ana Thorpe CNM  Obstetrics  [facility] -

## 2018-05-18 NOTE — PLAN OF CARE
Problem: Patient Care Overview  Goal: Plan of Care Review  Outcome: Ongoing (interventions implemented as appropriate)  Pt progressing well. Up ad lukas and voiding without difficulty. Pain controlled with po meds. Vitals stable. Bonding with baby. Breastfeeding. Anticipate discharge today.

## 2018-05-30 ENCOUNTER — TELEPHONE (OUTPATIENT)
Dept: OBSTETRICS AND GYNECOLOGY | Facility: CLINIC | Age: 23
End: 2018-05-30

## 2018-05-30 ENCOUNTER — POSTPARTUM VISIT (OUTPATIENT)
Dept: OBSTETRICS AND GYNECOLOGY | Facility: CLINIC | Age: 23
End: 2018-05-30
Payer: MEDICAID

## 2018-05-30 VITALS
HEIGHT: 62 IN | BODY MASS INDEX: 29.74 KG/M2 | DIASTOLIC BLOOD PRESSURE: 66 MMHG | WEIGHT: 161.63 LBS | SYSTOLIC BLOOD PRESSURE: 100 MMHG

## 2018-05-30 DIAGNOSIS — Z30.09 CONSULTATION FOR STERILIZATION: ICD-10-CM

## 2018-05-30 DIAGNOSIS — Z30.2 ENCOUNTER FOR FEMALE STERILIZATION PROCEDURE: Primary | ICD-10-CM

## 2018-05-30 PROCEDURE — 99999 PR PBB SHADOW E&M-EST. PATIENT-LVL II: CPT | Mod: PBBFAC,,, | Performed by: OBSTETRICS & GYNECOLOGY

## 2018-05-30 PROCEDURE — 99212 OFFICE O/P EST SF 10 MIN: CPT | Mod: TH,S$PBB,, | Performed by: OBSTETRICS & GYNECOLOGY

## 2018-05-30 PROCEDURE — 99212 OFFICE O/P EST SF 10 MIN: CPT | Mod: PBBFAC,TH | Performed by: OBSTETRICS & GYNECOLOGY

## 2018-05-30 NOTE — PROGRESS NOTES
Subjective:       Patient ID: Mattie Bledsoe is a 22 y.o. female.    Chief Complaint:  Postpartum Care      History of Present Illness  HPI  Pt is s/p uncomplicated  on 18.  Pt is here to discuss sterilization options.  Pt has 3 children and has no desire for future child bearing.  Pt is in a committed relationship.  Pt reports no complaints.  She and baby are doing well.  Denies depression/blues.  Is breastfeeding.    GYN & OB History  Patient's last menstrual period was 2017.   Date of Last Pap: 2017    OB History    Para Term  AB Living   3 3 1 2   3   SAB TAB Ectopic Multiple Live Births         0 1      # Outcome Date GA Lbr Gennaro/2nd Weight Sex Delivery Anes PTL Lv   3 Term 18 39w3d / 00:11 3.37 kg (7 lb 6.9 oz) M Vag-Spont EPI N MARLENY   2             1                    Review of Systems  Review of Systems   Constitutional: Negative for activity change, appetite change, fever and unexpected weight change.   Respiratory: Negative for shortness of breath.    Cardiovascular: Negative for chest pain.   Gastrointestinal: Negative for abdominal pain, constipation, diarrhea, nausea and vomiting.   Genitourinary: Negative for dysuria, flank pain, frequency, genital sores, hematuria, menstrual problem, pelvic pain, vaginal bleeding, vaginal discharge, vaginal pain and vaginal odor.   Musculoskeletal: Negative for back pain.   Neurological: Negative for syncope and headaches.           Objective:    Physical Exam:   Constitutional: She is oriented to person, place, and time. She appears well-developed and well-nourished. No distress.                           Neurological: She is alert and oriented to person, place, and time.     Psychiatric: She has a normal mood and affect. Her behavior is normal. Thought content normal.          Assessment:        1.  (normal spontaneous vaginal delivery)    2. Consultation for sterilization             Plan:        (normal spontaneous vaginal delivery)  -     Doing well.  Return for PP visit.    Consultation for sterilization  -     Pt was counseled on fertility planning options, including contraception and sterilization options.  Pt is done with her fertility and desires permanent sterilization.  Surgery details, indications, risks, benefits, and alternatives were discussed.  Medicaid consents were reviewed with pt and signed.  Will have Prudence contact pt to schedule C BTL.

## 2018-06-13 ENCOUNTER — POSTPARTUM VISIT (OUTPATIENT)
Dept: OBSTETRICS AND GYNECOLOGY | Facility: CLINIC | Age: 23
End: 2018-06-13
Payer: MEDICAID

## 2018-06-13 VITALS
BODY MASS INDEX: 29.21 KG/M2 | DIASTOLIC BLOOD PRESSURE: 82 MMHG | HEIGHT: 62 IN | WEIGHT: 158.75 LBS | SYSTOLIC BLOOD PRESSURE: 130 MMHG

## 2018-06-13 PROCEDURE — 99212 OFFICE O/P EST SF 10 MIN: CPT | Mod: PBBFAC,PN | Performed by: ADVANCED PRACTICE MIDWIFE

## 2018-06-13 PROCEDURE — 99999 PR PBB SHADOW E&M-EST. PATIENT-LVL II: CPT | Mod: PBBFAC,,, | Performed by: ADVANCED PRACTICE MIDWIFE

## 2018-06-13 NOTE — PROGRESS NOTES
Subjective:       Patient ID: Mattie Bledsoe is a 22 y.o. female.    Chief Complaint: Postpartum Care    Pt presents for PP visit.  Denies any problems.  Scheduled for BTL 7/2/18, pre-op has been scheduled.      Review of Systems   All other systems reviewed and are negative.      Objective:      Physical Exam   Constitutional: She is oriented to person, place, and time. She appears well-developed and well-nourished.   Pulmonary/Chest: Effort normal.   Abdominal: Soft.   Genitourinary: Vagina normal and uterus normal.   Genitourinary Comments: Well involuted, non-tender   Musculoskeletal: Normal range of motion.   Neurological: She is alert and oriented to person, place, and time.   Skin: Skin is warm and dry.   Psychiatric: She has a normal mood and affect. Her behavior is normal.   Vitals reviewed.      Assessment:    Normal Postpartum course    Plan:        BTL 7/2/18, pre-op complete

## 2018-06-27 ENCOUNTER — OFFICE VISIT (OUTPATIENT)
Dept: OBSTETRICS AND GYNECOLOGY | Facility: CLINIC | Age: 23
End: 2018-06-27
Payer: MEDICAID

## 2018-06-27 ENCOUNTER — HOSPITAL ENCOUNTER (OUTPATIENT)
Dept: PREADMISSION TESTING | Facility: HOSPITAL | Age: 23
Discharge: HOME | End: 2018-06-27
Attending: OBSTETRICS & GYNECOLOGY
Payer: MEDICAID

## 2018-06-27 VITALS — WEIGHT: 158 LBS | HEIGHT: 62 IN | BODY MASS INDEX: 29.08 KG/M2

## 2018-06-27 VITALS — BODY MASS INDEX: 29.68 KG/M2 | WEIGHT: 162.25 LBS

## 2018-06-27 DIAGNOSIS — Z30.2 ENCOUNTER FOR STERILIZATION: Primary | ICD-10-CM

## 2018-06-27 PROCEDURE — 99499 UNLISTED E&M SERVICE: CPT | Mod: S$PBB,,, | Performed by: OBSTETRICS & GYNECOLOGY

## 2018-06-27 PROCEDURE — 99999 PR PBB SHADOW E&M-EST. PATIENT-LVL II: CPT | Mod: PBBFAC,,, | Performed by: OBSTETRICS & GYNECOLOGY

## 2018-06-27 PROCEDURE — 99212 OFFICE O/P EST SF 10 MIN: CPT | Mod: PBBFAC | Performed by: OBSTETRICS & GYNECOLOGY

## 2018-06-27 NOTE — DISCHARGE INSTRUCTIONS
To confirm, Your doctor has instructed you that surgery is scheduled for 7/2/2018 at  10:57 am.       Please report to [facility], 1st floor, main lobby by 9:30 am.  Pre admit office will call afternoon prior to surgery with final arrival time    INSTRUCTIONS IMPORTANT!!!  You may have clear juice up until three hours prior to your procedure. OK to brush teeth, no gum, candy or mints!    ¨ Take only these medicines with a small swallow of water-morning of surgery.  None    Pre operative instructions:  Please review the Pre-Operative Instruction booklet that you were given.        Bathing Instructions--See page 6 in the Pre-operative booklet.      Prevention of surgical site infections:     -Keep incisions clean and dry.   -Do not soak/submerge incisions in water until completely healed.   -Do not apply lotions, powders, creams, or deodorants to site.   -Always make sure hands are cleaned with antibacterial soap/ alcohol-based                 prior to touching the surgical site.  (This includes doctors,                 nurses, staff, and yourself.)    Signs and symptoms:   -Redness and pain around the area where you had surgery   -Drainage of cloudy fluid from your surgical wound   -Fever over 100.4       I have read or had read and explained to me, and understand the above information.  Additional comments or instructions:  Received a copy of Pre-operative instructions booklet, FAQ surgical site infection sheet, and packets of hibiclens (if indicated).

## 2018-06-27 NOTE — H&P
[facility]  Obstetrics & Gynecology  History & Physical    Patient Name: Mattie Bledsoe  MRN: [id number]  Admission Date: (Not on file)  Primary Care Provider: Primary Doctor No    Subjective:     Chief Complaint/Reason for Admission: surgery    History of Present Illness:   23 yo  who desires permanent sterilization is here for scheduled sterilization surgery.  Pt reports no complaints and is ready for surgery.    No current outpatient prescriptions on file prior to visit.     No current facility-administered medications on file prior to visit.        Review of patient's allergies indicates:  No Known Allergies    Past Medical History:   Diagnosis Date    Abnormal Pap smear of cervix      OB History    Para Term  AB Living   3 3 1 2   3   SAB TAB Ectopic Multiple Live Births         0 1      # Outcome Date GA Lbr Gennaro/2nd Weight Sex Delivery Anes PTL Lv   3 Term 05/18 39w3d / 00:11 3.37 kg (7 lb 6.9 oz) M Vag-Spont EPI N MARLENY   2             1                  History reviewed. No pertinent surgical history.  Family History     None        Social History Main Topics    Smoking status: Never Smoker    Smokeless tobacco: Never Used    Alcohol use No    Drug use: No    Sexual activity: Yes     Partners: Male     Review of Systems   Constitutional: Negative for activity change, appetite change, fatigue, fever and unexpected weight change.   Respiratory: Negative for shortness of breath.    Cardiovascular: Negative for chest pain, palpitations and leg swelling.   Gastrointestinal: Negative for abdominal pain, bloating, blood in stool, constipation, diarrhea, nausea and vomiting.   Genitourinary: Negative for dyspareunia, dysuria, flank pain, frequency, genital sores, hematuria, menorrhagia, menstrual problem, pelvic pain, vaginal bleeding, vaginal discharge, vaginal pain, urinary incontinence and vaginal odor.   Musculoskeletal: Negative for back pain.   Neurological:  Negative for syncope and headaches.     Objective:     Vital Signs (Most Recent):    Vital Signs (24h Range):  [unfilled]     Weight: 73.6 kg (162 lb 4.1 oz)  Body mass index is 29.68 kg/m².  Patient's last menstrual period was 08/13/2017.    Physical Exam:   Constitutional: She is oriented to person, place, and time. She appears well-developed and well-nourished. No distress.    HENT:   Head: Normocephalic and atraumatic.    Eyes: EOM are normal. Pupils are equal, round, and reactive to light.    Neck: Normal range of motion. Neck supple.    Cardiovascular: Normal rate, regular rhythm and normal heart sounds.     Pulmonary/Chest: Effort normal and breath sounds normal.        Abdominal: Soft. Bowel sounds are normal. She exhibits no distension. There is no tenderness.             Musculoskeletal: Normal range of motion and moves all extremeties. She exhibits no edema or tenderness.       Neurological: She is alert and oriented to person, place, and time.    Skin: Skin is warm and dry.    Psychiatric: She has a normal mood and affect. Her behavior is normal. Thought content normal.       Laboratory:  I have personallly reviewed all pertinent lab results from the last 24 hours.    Diagnostic Results:  Labs: Reviewed  Pap reviewed    Assessment/Plan:     There are no hospital problems to display for this patient.    Encounter for sterilization  -     Procedure risks, benefits, and alternatives were discussed.  Increased risk of regret due to age < 24 yo was discussed.  Pt voiced understanding and expressed consent for LSC BTL.  Hospital forms were reviewed with pt and signed.  Pre-operative instructions were given.      Tutu Whaley MD  Obstetrics & Gynecology  [facility]

## 2018-06-29 ENCOUNTER — ANESTHESIA EVENT (OUTPATIENT)
Dept: SURGERY | Facility: HOSPITAL | Age: 23
End: 2018-06-29
Payer: MEDICAID

## 2018-07-02 ENCOUNTER — SURGERY (OUTPATIENT)
Age: 23
End: 2018-07-02

## 2018-07-02 ENCOUNTER — HOSPITAL ENCOUNTER (OUTPATIENT)
Facility: HOSPITAL | Age: 23
Discharge: HOME | End: 2018-07-02
Attending: OBSTETRICS & GYNECOLOGY | Admitting: OBSTETRICS & GYNECOLOGY
Payer: MEDICAID

## 2018-07-02 ENCOUNTER — ANESTHESIA (OUTPATIENT)
Dept: SURGERY | Facility: HOSPITAL | Age: 23
End: 2018-07-02
Payer: MEDICAID

## 2018-07-02 DIAGNOSIS — Z30.2 ENCOUNTER FOR STERILIZATION: ICD-10-CM

## 2018-07-02 DIAGNOSIS — Z98.51 STATUS POST TUBAL LIGATION: Primary | ICD-10-CM

## 2018-07-02 PROCEDURE — 25000003 PHARM REV CODE 250: Performed by: ANESTHESIOLOGY

## 2018-07-02 PROCEDURE — 63600175 PHARM REV CODE 636 W HCPCS: Performed by: NURSE ANESTHETIST, CERTIFIED REGISTERED

## 2018-07-02 PROCEDURE — 25000003 PHARM REV CODE 250: Performed by: NURSE ANESTHETIST, CERTIFIED REGISTERED

## 2018-07-02 PROCEDURE — 71000015 HC POSTOP RECOV 1ST HR: Performed by: OBSTETRICS & GYNECOLOGY

## 2018-07-02 PROCEDURE — 00851 ANES IPER PX TUBAL LIGATION: CPT | Performed by: OBSTETRICS & GYNECOLOGY

## 2018-07-02 PROCEDURE — 36000708 HC OR TIME LEV III 1ST 15 MIN: Performed by: OBSTETRICS & GYNECOLOGY

## 2018-07-02 PROCEDURE — 37000008 HC ANESTHESIA 1ST 15 MINUTES: Performed by: OBSTETRICS & GYNECOLOGY

## 2018-07-02 PROCEDURE — 27800903 OPTIME MED/SURG SUP & DEVICES OTHER IMPLANTS: Performed by: OBSTETRICS & GYNECOLOGY

## 2018-07-02 PROCEDURE — 71000033 HC RECOVERY, INTIAL HOUR: Performed by: OBSTETRICS & GYNECOLOGY

## 2018-07-02 PROCEDURE — 37000009 HC ANESTHESIA EA ADD 15 MINS: Performed by: OBSTETRICS & GYNECOLOGY

## 2018-07-02 PROCEDURE — 58671 LAPAROSCOPY TUBAL BLOCK: CPT | Mod: ,,, | Performed by: OBSTETRICS & GYNECOLOGY

## 2018-07-02 PROCEDURE — 36000709 HC OR TIME LEV III EA ADD 15 MIN: Performed by: OBSTETRICS & GYNECOLOGY

## 2018-07-02 PROCEDURE — 63600175 PHARM REV CODE 636 W HCPCS: Performed by: ANESTHESIOLOGY

## 2018-07-02 DEVICE — RING FALOPIAN RING: Type: IMPLANTABLE DEVICE | Site: FALLOPIAN TUBE | Status: FUNCTIONAL

## 2018-07-02 RX ORDER — OXYCODONE HYDROCHLORIDE 5 MG/1
5 TABLET ORAL
Status: DISCONTINUED | OUTPATIENT
Start: 2018-07-02 | End: 2018-07-02 | Stop reason: HOSPADM

## 2018-07-02 RX ORDER — PROPOFOL 10 MG/ML
VIAL (ML) INTRAVENOUS
Status: DISCONTINUED | OUTPATIENT
Start: 2018-07-02 | End: 2018-07-02

## 2018-07-02 RX ORDER — DIPHENHYDRAMINE HCL 25 MG
25 CAPSULE ORAL EVERY 4 HOURS PRN
Status: DISCONTINUED | OUTPATIENT
Start: 2018-07-02 | End: 2018-07-02 | Stop reason: HOSPADM

## 2018-07-02 RX ORDER — MIDAZOLAM HYDROCHLORIDE 1 MG/ML
INJECTION, SOLUTION INTRAMUSCULAR; INTRAVENOUS
Status: DISCONTINUED | OUTPATIENT
Start: 2018-07-02 | End: 2018-07-02

## 2018-07-02 RX ORDER — KETOROLAC TROMETHAMINE 30 MG/ML
30 INJECTION, SOLUTION INTRAMUSCULAR; INTRAVENOUS ONCE
Status: DISCONTINUED | OUTPATIENT
Start: 2018-07-02 | End: 2018-07-02 | Stop reason: HOSPADM

## 2018-07-02 RX ORDER — HYDROMORPHONE HYDROCHLORIDE 1 MG/ML
1 INJECTION, SOLUTION INTRAMUSCULAR; INTRAVENOUS; SUBCUTANEOUS EVERY 4 HOURS PRN
Status: DISCONTINUED | OUTPATIENT
Start: 2018-07-02 | End: 2018-07-02 | Stop reason: HOSPADM

## 2018-07-02 RX ORDER — NEOSTIGMINE METHYLSULFATE 1 MG/ML
INJECTION, SOLUTION INTRAVENOUS
Status: DISCONTINUED | OUTPATIENT
Start: 2018-07-02 | End: 2018-07-02

## 2018-07-02 RX ORDER — HYDROCODONE BITARTRATE AND ACETAMINOPHEN 5; 325 MG/1; MG/1
1 TABLET ORAL EVERY 4 HOURS PRN
Qty: 15 TABLET | Refills: 0 | Status: SHIPPED | OUTPATIENT
Start: 2018-07-02 | End: 2018-08-01

## 2018-07-02 RX ORDER — KETOROLAC TROMETHAMINE 30 MG/ML
INJECTION, SOLUTION INTRAMUSCULAR; INTRAVENOUS
Status: DISCONTINUED | OUTPATIENT
Start: 2018-07-02 | End: 2018-07-02

## 2018-07-02 RX ORDER — LIDOCAINE HYDROCHLORIDE 10 MG/ML
1 INJECTION, SOLUTION EPIDURAL; INFILTRATION; INTRACAUDAL; PERINEURAL ONCE
Status: DISCONTINUED | OUTPATIENT
Start: 2018-07-02 | End: 2018-07-02 | Stop reason: HOSPADM

## 2018-07-02 RX ORDER — FENTANYL CITRATE 50 UG/ML
25 INJECTION, SOLUTION INTRAMUSCULAR; INTRAVENOUS EVERY 5 MIN PRN
Status: DISCONTINUED | OUTPATIENT
Start: 2018-07-02 | End: 2018-07-02 | Stop reason: HOSPADM

## 2018-07-02 RX ORDER — ONDANSETRON 2 MG/ML
INJECTION INTRAMUSCULAR; INTRAVENOUS
Status: DISCONTINUED | OUTPATIENT
Start: 2018-07-02 | End: 2018-07-02

## 2018-07-02 RX ORDER — FENTANYL CITRATE 50 UG/ML
INJECTION, SOLUTION INTRAMUSCULAR; INTRAVENOUS
Status: DISCONTINUED | OUTPATIENT
Start: 2018-07-02 | End: 2018-07-02

## 2018-07-02 RX ORDER — SODIUM CHLORIDE, SODIUM LACTATE, POTASSIUM CHLORIDE, CALCIUM CHLORIDE 600; 310; 30; 20 MG/100ML; MG/100ML; MG/100ML; MG/100ML
INJECTION, SOLUTION INTRAVENOUS CONTINUOUS
Status: DISCONTINUED | OUTPATIENT
Start: 2018-07-02 | End: 2018-07-02 | Stop reason: HOSPADM

## 2018-07-02 RX ORDER — ONDANSETRON 8 MG/1
8 TABLET, ORALLY DISINTEGRATING ORAL EVERY 8 HOURS PRN
Status: DISCONTINUED | OUTPATIENT
Start: 2018-07-02 | End: 2018-07-02 | Stop reason: HOSPADM

## 2018-07-02 RX ORDER — SODIUM CHLORIDE 0.9 % (FLUSH) 0.9 %
3 SYRINGE (ML) INJECTION EVERY 8 HOURS
Status: DISCONTINUED | OUTPATIENT
Start: 2018-07-02 | End: 2018-07-02 | Stop reason: HOSPADM

## 2018-07-02 RX ORDER — MEPERIDINE HYDROCHLORIDE 50 MG/ML
12.5 INJECTION INTRAMUSCULAR; INTRAVENOUS; SUBCUTANEOUS EVERY 10 MIN PRN
Status: DISCONTINUED | OUTPATIENT
Start: 2018-07-02 | End: 2018-07-02 | Stop reason: HOSPADM

## 2018-07-02 RX ORDER — DIPHENHYDRAMINE HYDROCHLORIDE 50 MG/ML
25 INJECTION INTRAMUSCULAR; INTRAVENOUS EVERY 6 HOURS PRN
Status: DISCONTINUED | OUTPATIENT
Start: 2018-07-02 | End: 2018-07-02 | Stop reason: HOSPADM

## 2018-07-02 RX ORDER — AMOXICILLIN 250 MG
1 CAPSULE ORAL 2 TIMES DAILY
Status: DISCONTINUED | OUTPATIENT
Start: 2018-07-02 | End: 2018-07-02 | Stop reason: HOSPADM

## 2018-07-02 RX ORDER — SODIUM CHLORIDE 0.9 % (FLUSH) 0.9 %
3 SYRINGE (ML) INJECTION
Status: DISCONTINUED | OUTPATIENT
Start: 2018-07-02 | End: 2018-07-02 | Stop reason: HOSPADM

## 2018-07-02 RX ORDER — GLYCOPYRROLATE 0.2 MG/ML
INJECTION INTRAMUSCULAR; INTRAVENOUS
Status: DISCONTINUED | OUTPATIENT
Start: 2018-07-02 | End: 2018-07-02

## 2018-07-02 RX ORDER — DIPHENHYDRAMINE HYDROCHLORIDE 50 MG/ML
25 INJECTION INTRAMUSCULAR; INTRAVENOUS EVERY 4 HOURS PRN
Status: DISCONTINUED | OUTPATIENT
Start: 2018-07-02 | End: 2018-07-02 | Stop reason: HOSPADM

## 2018-07-02 RX ORDER — HYDROCODONE BITARTRATE AND ACETAMINOPHEN 5; 325 MG/1; MG/1
1 TABLET ORAL EVERY 4 HOURS PRN
Status: DISCONTINUED | OUTPATIENT
Start: 2018-07-02 | End: 2018-07-02 | Stop reason: HOSPADM

## 2018-07-02 RX ORDER — DEXAMETHASONE SODIUM PHOSPHATE 4 MG/ML
INJECTION, SOLUTION INTRA-ARTICULAR; INTRALESIONAL; INTRAMUSCULAR; INTRAVENOUS; SOFT TISSUE
Status: DISCONTINUED | OUTPATIENT
Start: 2018-07-02 | End: 2018-07-02

## 2018-07-02 RX ORDER — LIDOCAINE HYDROCHLORIDE 20 MG/ML
JELLY TOPICAL
Status: DISCONTINUED | OUTPATIENT
Start: 2018-07-02 | End: 2018-07-02

## 2018-07-02 RX ORDER — ROCURONIUM BROMIDE 10 MG/ML
INJECTION, SOLUTION INTRAVENOUS
Status: DISCONTINUED | OUTPATIENT
Start: 2018-07-02 | End: 2018-07-02

## 2018-07-02 RX ORDER — HYDROCODONE BITARTRATE AND ACETAMINOPHEN 10; 325 MG/1; MG/1
1 TABLET ORAL EVERY 4 HOURS PRN
Status: DISCONTINUED | OUTPATIENT
Start: 2018-07-02 | End: 2018-07-02 | Stop reason: HOSPADM

## 2018-07-02 RX ORDER — LIDOCAINE HYDROCHLORIDE 10 MG/ML
INJECTION INFILTRATION; PERINEURAL
Status: DISCONTINUED | OUTPATIENT
Start: 2018-07-02 | End: 2018-07-02

## 2018-07-02 RX ADMIN — ONDANSETRON 4 MG: 2 INJECTION, SOLUTION INTRAMUSCULAR; INTRAVENOUS at 11:07

## 2018-07-02 RX ADMIN — NEOSTIGMINE METHYLSULFATE 3 MG: 1 INJECTION INTRAVENOUS at 11:07

## 2018-07-02 RX ADMIN — FENTANYL CITRATE 100 MCG: 50 INJECTION, SOLUTION INTRAMUSCULAR; INTRAVENOUS at 10:07

## 2018-07-02 RX ADMIN — LIDOCAINE HYDROCHLORIDE 1 EACH: 20 JELLY TOPICAL at 10:07

## 2018-07-02 RX ADMIN — LIDOCAINE HYDROCHLORIDE 50 MG: 10 INJECTION, SOLUTION INFILTRATION; PERINEURAL at 10:07

## 2018-07-02 RX ADMIN — MIDAZOLAM 2 MG: 1 INJECTION INTRAMUSCULAR; INTRAVENOUS at 10:07

## 2018-07-02 RX ADMIN — MEPERIDINE HYDROCHLORIDE 12.5 MG: 50 INJECTION INTRAMUSCULAR; INTRAVENOUS; SUBCUTANEOUS at 11:07

## 2018-07-02 RX ADMIN — OXYCODONE HYDROCHLORIDE 5 MG: 5 TABLET ORAL at 12:07

## 2018-07-02 RX ADMIN — KETOROLAC TROMETHAMINE 30 MG: 30 INJECTION, SOLUTION INTRAMUSCULAR; INTRAVENOUS at 11:07

## 2018-07-02 RX ADMIN — SODIUM CHLORIDE, SODIUM LACTATE, POTASSIUM CHLORIDE, AND CALCIUM CHLORIDE: 600; 310; 30; 20 INJECTION, SOLUTION INTRAVENOUS at 10:07

## 2018-07-02 RX ADMIN — PROPOFOL 150 MG: 10 INJECTION, EMULSION INTRAVENOUS at 10:07

## 2018-07-02 RX ADMIN — ROBINUL 0.4 MG: 0.2 INJECTION INTRAMUSCULAR; INTRAVENOUS at 11:07

## 2018-07-02 RX ADMIN — DEXAMETHASONE SODIUM PHOSPHATE 4 MG: 4 INJECTION, SOLUTION INTRA-ARTICULAR; INTRALESIONAL; INTRAMUSCULAR; INTRAVENOUS; SOFT TISSUE at 11:07

## 2018-07-02 RX ADMIN — ROCURONIUM BROMIDE 25 MG: 10 INJECTION, SOLUTION INTRAVENOUS at 10:07

## 2018-07-02 NOTE — TRANSFER OF CARE
"Anesthesia Transfer of Care Note    Patient: Mattie Bledsoe    Procedure(s) Performed: Procedure(s) (LRB):  OCCLUSION, FALLOPIAN TUBE, LAPAROSCOPIC, USING DEVICE (Bilateral)    Patient location: PACU    Anesthesia Type: general    Transport from OR: Transported from OR on room air with adequate spontaneous ventilation    Post pain: adequate analgesia    Post assessment: no apparent anesthetic complications    Post vital signs: stable    Level of consciousness: awake, alert and oriented    Nausea/Vomiting: no nausea/vomiting    Complications: none    Transfer of care protocol was followed      Last vitals:   Visit Vitals  BP (!) 111/56 (BP Location: Right arm, Patient Position: Sitting)   Pulse 63   Temp 36.9 °C (98.4 °F) (Tympanic)   Resp 18   Ht 5' 2" (1.575 m)   Wt 72.6 kg (160 lb)   SpO2 100%   Breastfeeding? No   BMI 29.26 kg/m²     "

## 2018-07-02 NOTE — INTERVAL H&P NOTE
The patient has been examined and the H&P has been reviewed:    I concur with the findings and no changes have occurred since H&P was written.    Anesthesia/Surgery risks, benefits and alternative options discussed and understood by patient/family.          Active Hospital Problems    Diagnosis  POA    Encounter for sterilization [Z30.2]  Not Applicable      Resolved Hospital Problems    Diagnosis Date Resolved POA   No resolved problems to display.

## 2018-07-02 NOTE — DISCHARGE SUMMARY
Discharge Note  Short Stay      SUMMARY     Admit Date: 7/2/2018    Attending Physician: Tutu Whaley MD     Discharge Physician: Tutu Whaley MD    Discharge Date: 7/2/2018 11:25 AM    Final Diagnosis:   1. Status post tubal ligation    2. Encounter for sterilization        Disposition: Home or Self Care    Condition:  Stable    Hospital Course:  Pt was admitted for scheduled sterilization surgery.  LSC BTL was performed without complications.  Post-operative course was unremarkable and pt recovered well.  Pt was discharged upon meeting all discharge criteria.  Pt was counseled on post-operative care and warning sign instructions prior to her discharge.    Patient Instructions:   Current Discharge Medication List      START taking these medications    Details   HYDROcodone-acetaminophen (NORCO) 5-325 mg per tablet Take 1 tablet by mouth every 4 (four) hours as needed for Pain.  Qty: 15 tablet, Refills: 0    Associated Diagnoses: Status post tubal ligation             Discharge Procedure Orders (must include Diet, Follow-up, Activity)    Discharge Procedure Orders (must include Diet, Follow-up, Activity)  Diet general     Other restrictions (specify):   Order Comments: Pelvic rest and light activity x 2 weeks     Call MD for:  persistent dizziness or light-headedness     Call MD for:  extreme fatigue     Call MD for:  hives     Call MD for:  redness, tenderness, or signs of infection (pain, swelling, redness, odor or green/yellow discharge around incision site)     Call MD for:  difficulty breathing, headache or visual disturbances     Call MD for:  severe uncontrolled pain     Call MD for:  persistent nausea and vomiting     Call MD for:  temperature >100.4     No dressing needed        Follow up with Dr. Whaley in 4 weeks

## 2018-07-02 NOTE — ANESTHESIA POSTPROCEDURE EVALUATION
"Anesthesia Post Evaluation    Patient: Mattie Bledsoe    Procedure(s) Performed: Procedure(s) (LRB):  OCCLUSION, FALLOPIAN TUBE, LAPAROSCOPIC, USING DEVICE (Bilateral)    Final Anesthesia Type: general  Patient location during evaluation: PACU  Patient participation: Yes- Able to Participate  Level of consciousness: awake  Post-procedure vital signs: reviewed and stable  Pain management: adequate  Airway patency: patent  PONV status at discharge: No PONV  Anesthetic complications: no      Cardiovascular status: blood pressure returned to baseline  Respiratory status: unassisted, spontaneous ventilation and room air  Hydration status: euvolemic  Follow-up not needed.        Visit Vitals  /71   Pulse 73   Temp 37.1 °C (98.7 °F)   Resp 12   Ht 5' 2" (1.575 m)   Wt 72.6 kg (160 lb)   SpO2 99%   Breastfeeding? No   BMI 29.26 kg/m²       Pain/Jesse Score: Pain Assessment Performed: Yes (7/2/2018 12:30 PM)  Presence of Pain: complains of pain/discomfort (7/2/2018 12:30 PM)  Pain Rating Prior to Med Admin: 3 (7/2/2018 12:33 PM)  Jesse Score: 10 (7/2/2018 12:30 PM)      "

## 2018-07-02 NOTE — H&P (VIEW-ONLY)
[facility]  Obstetrics & Gynecology  History & Physical    Patient Name: Mattie Bledsoe  MRN: [id number]  Admission Date: (Not on file)  Primary Care Provider: Primary Doctor No    Subjective:     Chief Complaint/Reason for Admission: surgery    History of Present Illness:   21 yo  who desires permanent sterilization is here for scheduled sterilization surgery.  Pt reports no complaints and is ready for surgery.    No current outpatient prescriptions on file prior to visit.     No current facility-administered medications on file prior to visit.        Review of patient's allergies indicates:  No Known Allergies    Past Medical History:   Diagnosis Date    Abnormal Pap smear of cervix      OB History    Para Term  AB Living   3 3 1 2   3   SAB TAB Ectopic Multiple Live Births         0 1      # Outcome Date GA Lbr Gennaro/2nd Weight Sex Delivery Anes PTL Lv   3 Term 05/18 39w3d / 00:11 3.37 kg (7 lb 6.9 oz) M Vag-Spont EPI N MARLENY   2             1                  History reviewed. No pertinent surgical history.  Family History     None        Social History Main Topics    Smoking status: Never Smoker    Smokeless tobacco: Never Used    Alcohol use No    Drug use: No    Sexual activity: Yes     Partners: Male     Review of Systems   Constitutional: Negative for activity change, appetite change, fatigue, fever and unexpected weight change.   Respiratory: Negative for shortness of breath.    Cardiovascular: Negative for chest pain, palpitations and leg swelling.   Gastrointestinal: Negative for abdominal pain, bloating, blood in stool, constipation, diarrhea, nausea and vomiting.   Genitourinary: Negative for dyspareunia, dysuria, flank pain, frequency, genital sores, hematuria, menorrhagia, menstrual problem, pelvic pain, vaginal bleeding, vaginal discharge, vaginal pain, urinary incontinence and vaginal odor.   Musculoskeletal: Negative for back pain.   Neurological:  Negative for syncope and headaches.     Objective:     Vital Signs (Most Recent):    Vital Signs (24h Range):  [unfilled]     Weight: 73.6 kg (162 lb 4.1 oz)  Body mass index is 29.68 kg/m².  Patient's last menstrual period was 08/13/2017.    Physical Exam:   Constitutional: She is oriented to person, place, and time. She appears well-developed and well-nourished. No distress.    HENT:   Head: Normocephalic and atraumatic.    Eyes: EOM are normal. Pupils are equal, round, and reactive to light.    Neck: Normal range of motion. Neck supple.    Cardiovascular: Normal rate, regular rhythm and normal heart sounds.     Pulmonary/Chest: Effort normal and breath sounds normal.        Abdominal: Soft. Bowel sounds are normal. She exhibits no distension. There is no tenderness.             Musculoskeletal: Normal range of motion and moves all extremeties. She exhibits no edema or tenderness.       Neurological: She is alert and oriented to person, place, and time.    Skin: Skin is warm and dry.    Psychiatric: She has a normal mood and affect. Her behavior is normal. Thought content normal.       Laboratory:  I have personallly reviewed all pertinent lab results from the last 24 hours.    Diagnostic Results:  Labs: Reviewed  Pap reviewed    Assessment/Plan:     There are no hospital problems to display for this patient.    Encounter for sterilization  -     Procedure risks, benefits, and alternatives were discussed.  Increased risk of regret due to age < 24 yo was discussed.  Pt voiced understanding and expressed consent for LSC BTL.  Hospital forms were reviewed with pt and signed.  Pre-operative instructions were given.      Tutu Whaley MD  Obstetrics & Gynecology  [facility]

## 2018-07-02 NOTE — OP NOTE
OPERATIVE NOTE    DATE:  07/02/2018    PRE-PROCEDURE COUNSELING:  Patient counseled on the risks, benefits, and alternatives to procedure.  Please see preoperative consents.   SCDs were applied and working prior to anesthesia induction.    PRE-PROCEDURE DIAGNOSIS:  Desires sterilization    POST-PROCEDURE DIAGNOSIS: Same    ANESTHESIA:  General Endotracheal Anesthesia    PROCEDURE   Laparoscopic Bilateral Tubal Ligation (Falope Ring)     SURGEON:  Tutu Whaley MD    ASSISTANT: GEO Rodríguez    FINDINGS: Small uterus with moderate descent, normal appearing tubes and ovaries    SPECIMEN:  None     EBL: 5mL     COMPLICATIONS: None    IMPLANTS:  None    PROCEDURE IN DETAIL:  TIME OUT PERFORMED  SCDs were on prior to anesthesia induction.  Patient was placed in dorsal lithotomy position, then prepped and draped in routine fashion. A sterile speculum was placed in the vagina. Anterior lip of the cervix was grasped with a single tooth tenaculum and a ZUMI manipultor was placed without difficulty.  Instruments were then removed from the vagina and the bladder drained with a disposable catheter.    Attention was then turned to the abdomen where the anterior abdominal wall was grasped with towel clamps and elevated.  A Veress needle was gently introduced through the umbilicus and water drop test performed to confirm intraabdominal placement.  The abdomen was insufflated with CO2 gas to an intraabdominal pressure of 15 mmHg.  Veress was then removed and a 5 mm  incision was made in the umbilicus.  A 5 mm trocar was placed into the abdomen and general anatomic survey was performed revealing the above findings. Under direct visualization, an ancillary 8 mm suprapubic port were placed.  The Falope ring applicator was used to place a ring on each fallopian tube about 2-3cm from the uterine cornu, incorporating at least 1-2 cm of tube. This was done on each side with no problems. Pictures were taken and added to the chart.   Skin wounds were approximated with 4-0 Monocryl and an adhesive glue layer was added for reinforcement.  Lap, sharp, and instrument counts were correct x 2.      Patient was sent to the recovery room in stable condition    CONDITION: stable    DISPOSITION: recovery room

## 2018-07-02 NOTE — ANESTHESIA PREPROCEDURE EVALUATION
07/02/2018  Mattie Bledsoe is a 22 y.o., female.    Anesthesia Evaluation    I have reviewed the Patient Summary Reports.    I have reviewed the Nursing Notes.   I have reviewed the Medications.     Review of Systems  Anesthesia Hx:  No problems with previous Anesthesia    Social:  Non-Smoker    Cardiovascular:  Cardiovascular Normal     Pulmonary:  Pulmonary Normal    Hepatic/GI:  Hepatic/GI Normal    Endocrine:  Endocrine Normal        Physical Exam  General:  Well nourished    Airway/Jaw/Neck:  Airway Findings: Mouth Opening: Normal Tongue: Normal  General Airway Assessment: Adult       Chest/Lungs:  Chest/Lungs Findings: Normal Respiratory Rate     Heart/Vascular:  Heart Findings: Rate: Normal             Anesthesia Plan  Type of Anesthesia, risks & benefits discussed:  Anesthesia Type:  general  Patient's Preference:   Intra-op Monitoring Plan: standard ASA monitors  Intra-op Monitoring Plan Comments:   Post Op Pain Control Plan:   Post Op Pain Control Plan Comments:   Induction:   IV  Beta Blocker:  Patient is not currently on a Beta-Blocker (No further documentation required).       Informed Consent: Patient understands risks and agrees with Anesthesia plan.  Questions answered. Anesthesia consent signed with patient.  ASA Score: 1     Day of Surgery Review of History & Physical: I have interviewed and examined the patient. I have reviewed the patient's H&P dated:  There are no significant changes.          Ready For Surgery From Anesthesia Perspective.

## 2018-07-02 NOTE — DISCHARGE INSTRUCTIONS
General Information:    1. Do not drink alcoholic beverages including beer for 24 hours or as long as you are on pain medication..  2. Do not drive a motor vehicle, operate machinery or power tools, or signs legal papers for 24 hours or as long as you are on pain medication.   3. You may experience light-headedness, dizziness, and sleepiness following surgery. Please do not stay alone. A responsible adult should be with you for this 24 hour period.  4. Go home and rest.  5. Progress slowly to a normal diet unless instructed.  Otherwise, begin with liquids such as soft drinks, then soup and crackers working up to solid foods. Drink plenty of nonalcoholic fluids.  6. Certain anesthetics and pain medications produce nausea and vomiting in certain individuals. If nausea becomes a problem at home, call you doctor.  7. A nurse will be calling you sometime after surgery. Do not be alarmed. This is our way of finding out how you are doing.  8. Several times every hour while you are awake, take 2-3 deep breaths and cough. If you had stomach surgery hold a pillow or rolled towel firmly against your stomach before you cough. This will help with any pain the cough might cause.  9. Several times every hour while you are awake, pump and flex your feet 5-6 times and do foot circles. This will help prevent blood clots.  10. Call your doctor for severe pain, bleeding, fever, or signs or symptoms of infection (pain, swelling, redness, foul odor, drainage).  11. You can contact your doctor anytime by [phone] for the [facility] (at [facility]) or [phone] for the [facility] on [facility].   [email or web address] is another way to contact your doctor if you are an active participant online with [facility].  12. Continue Nozin provided at discharge twice daily for 7 days or until the incision is healed.  See pamphlet or box for instructions.

## 2018-07-03 VITALS
HEIGHT: 62 IN | BODY MASS INDEX: 29.44 KG/M2 | TEMPERATURE: 99 F | RESPIRATION RATE: 12 BRPM | OXYGEN SATURATION: 99 % | DIASTOLIC BLOOD PRESSURE: 71 MMHG | SYSTOLIC BLOOD PRESSURE: 120 MMHG | HEART RATE: 73 BPM | WEIGHT: 160 LBS

## 2018-07-12 ENCOUNTER — TELEPHONE (OUTPATIENT)
Dept: OBSTETRICS AND GYNECOLOGY | Facility: CLINIC | Age: 23
End: 2018-07-12

## 2018-08-01 ENCOUNTER — OFFICE VISIT (OUTPATIENT)
Dept: OBSTETRICS AND GYNECOLOGY | Facility: CLINIC | Age: 23
End: 2018-08-01
Payer: MEDICAID

## 2018-08-01 VITALS
HEIGHT: 62 IN | DIASTOLIC BLOOD PRESSURE: 72 MMHG | BODY MASS INDEX: 30.47 KG/M2 | SYSTOLIC BLOOD PRESSURE: 108 MMHG | WEIGHT: 165.56 LBS

## 2018-08-01 DIAGNOSIS — Z98.51 STATUS POST TUBAL LIGATION: Primary | ICD-10-CM

## 2018-08-01 PROBLEM — Z30.2 ENCOUNTER FOR STERILIZATION: Status: RESOLVED | Noted: 2018-07-02 | Resolved: 2018-08-01

## 2018-08-01 PROCEDURE — 99212 OFFICE O/P EST SF 10 MIN: CPT | Mod: PBBFAC | Performed by: OBSTETRICS & GYNECOLOGY

## 2018-08-01 PROCEDURE — 99999 PR PBB SHADOW E&M-EST. PATIENT-LVL II: CPT | Mod: PBBFAC,,, | Performed by: OBSTETRICS & GYNECOLOGY

## 2018-08-01 PROCEDURE — 99024 POSTOP FOLLOW-UP VISIT: CPT | Mod: ,,, | Performed by: OBSTETRICS & GYNECOLOGY

## 2018-08-01 NOTE — PROGRESS NOTES
Subjective:       Patient ID: Mattie Bledsoe is a 22 y.o. female.    Chief Complaint:  Post-op Evaluation      History of Present Illness  HPI  Pt is s/p LSC BTL on 2018.  Pt is here for her scheduled post-operative visit.  Pt reports no complaints and is happy with results.  Denies pain or bleeding.    GYN & OB History  Patient's last menstrual period was 2018.   Date of Last Pap: 2017    OB History    Para Term  AB Living   3 3 1 2   3   SAB TAB Ectopic Multiple Live Births         0 1      # Outcome Date GA Lbr Gennaro/2nd Weight Sex Delivery Anes PTL Lv   3 Term 18 39w3d / 00:11 3.37 kg (7 lb 6.9 oz) M Vag-Spont EPI N MARLENY   2             1                    Review of Systems  Review of Systems   Constitutional: Negative for activity change, appetite change, fatigue, fever and unexpected weight change.   Respiratory: Negative for shortness of breath.    Cardiovascular: Negative for chest pain, palpitations and leg swelling.   Gastrointestinal: Negative for abdominal pain, bloating, blood in stool, constipation, diarrhea, nausea and vomiting.   Genitourinary: Negative for dyspareunia, dysuria, flank pain, frequency, genital sores, hematuria, menorrhagia, menstrual problem, pelvic pain, urgency, vaginal bleeding, vaginal discharge, vaginal pain, dysmenorrhea, urinary incontinence and vaginal odor.   Musculoskeletal: Negative for back pain.   Neurological: Negative for syncope and headaches.           Objective:    Physical Exam:   Constitutional: She is oriented to person, place, and time. She appears well-developed and well-nourished. No distress.       Cardiovascular: Normal rate, regular rhythm and normal heart sounds.     Pulmonary/Chest: Effort normal and breath sounds normal.        Abdominal: Soft. Bowel sounds are normal. She exhibits abdominal incision (well healed). She exhibits no distension. There is no tenderness.     Genitourinary: Vagina normal and  uterus normal. Pelvic exam was performed with patient supine. There is no rash, tenderness, lesion or injury on the right labia. There is no rash, tenderness, lesion or injury on the left labia. Uterus is not deviated, not enlarged and not tender. Cervix is normal. Right adnexum displays no mass, no tenderness and no fullness. Left adnexum displays no mass, no tenderness and no fullness. No erythema, tenderness or bleeding in the vagina. No foreign body in the vagina. No signs of injury around the vagina. No vaginal discharge found. Cervix exhibits no motion tenderness, no discharge and no friability.           Musculoskeletal: Normal range of motion and moves all extremeties. She exhibits no edema or tenderness.       Neurological: She is alert and oriented to person, place, and time.    Skin: Skin is warm and dry.    Psychiatric: She has a normal mood and affect. Her behavior is normal. Thought content normal.          Assessment:        1. Status post tubal ligation              Plan:      Status post tubal ligation  -      Pt doing well and happy with results.  Pt cleared to return to all activities.      Follow-up for annual exam.

## 2021-05-24 ENCOUNTER — TELEPHONE (OUTPATIENT)
Dept: OBSTETRICS AND GYNECOLOGY | Facility: CLINIC | Age: 26
End: 2021-05-24

## 2021-06-03 ENCOUNTER — OFFICE VISIT (OUTPATIENT)
Dept: OBSTETRICS AND GYNECOLOGY | Facility: CLINIC | Age: 26
End: 2021-06-03
Payer: MEDICAID

## 2021-06-03 ENCOUNTER — LAB VISIT (OUTPATIENT)
Dept: LAB | Facility: HOSPITAL | Age: 26
End: 2021-06-03
Attending: NURSE PRACTITIONER
Payer: MEDICAID

## 2021-06-03 VITALS
BODY MASS INDEX: 27.92 KG/M2 | SYSTOLIC BLOOD PRESSURE: 116 MMHG | HEIGHT: 62 IN | WEIGHT: 151.69 LBS | DIASTOLIC BLOOD PRESSURE: 58 MMHG

## 2021-06-03 DIAGNOSIS — Z11.3 SCREENING FOR STDS (SEXUALLY TRANSMITTED DISEASES): ICD-10-CM

## 2021-06-03 DIAGNOSIS — Z01.419 ROUTINE GYNECOLOGICAL EXAMINATION: Primary | ICD-10-CM

## 2021-06-03 DIAGNOSIS — Z12.4 PAPANICOLAOU SMEAR FOR CERVICAL CANCER SCREENING: ICD-10-CM

## 2021-06-03 PROCEDURE — 88141 PR  CYTOPATH CERV/VAG INTERPRET: ICD-10-PCS | Mod: ,,, | Performed by: PATHOLOGY

## 2021-06-03 PROCEDURE — 86703 HIV-1/HIV-2 1 RESULT ANTBDY: CPT | Performed by: NURSE PRACTITIONER

## 2021-06-03 PROCEDURE — 88141 CYTOPATH C/V INTERPRET: CPT | Mod: ,,, | Performed by: PATHOLOGY

## 2021-06-03 PROCEDURE — 88175 CYTOPATH C/V AUTO FLUID REDO: CPT | Performed by: PATHOLOGY

## 2021-06-03 PROCEDURE — 87624 HPV HI-RISK TYP POOLED RSLT: CPT | Performed by: NURSE PRACTITIONER

## 2021-06-03 PROCEDURE — 99212 OFFICE O/P EST SF 10 MIN: CPT | Mod: PBBFAC | Performed by: NURSE PRACTITIONER

## 2021-06-03 PROCEDURE — 99999 PR PBB SHADOW E&M-EST. PATIENT-LVL II: CPT | Mod: PBBFAC,,, | Performed by: NURSE PRACTITIONER

## 2021-06-03 PROCEDURE — 86592 SYPHILIS TEST NON-TREP QUAL: CPT | Performed by: NURSE PRACTITIONER

## 2021-06-03 PROCEDURE — 99999 PR PBB SHADOW E&M-EST. PATIENT-LVL II: ICD-10-PCS | Mod: PBBFAC,,, | Performed by: NURSE PRACTITIONER

## 2021-06-03 PROCEDURE — 99395 PR PREVENTIVE VISIT,EST,18-39: ICD-10-PCS | Mod: S$PBB,,, | Performed by: NURSE PRACTITIONER

## 2021-06-03 PROCEDURE — 99395 PREV VISIT EST AGE 18-39: CPT | Mod: S$PBB,,, | Performed by: NURSE PRACTITIONER

## 2021-06-03 PROCEDURE — 36415 COLL VENOUS BLD VENIPUNCTURE: CPT | Performed by: NURSE PRACTITIONER

## 2021-06-04 LAB
HIV 1+2 AB+HIV1 P24 AG SERPL QL IA: NEGATIVE
RPR SER QL: NORMAL

## 2021-06-09 LAB
FINAL PATHOLOGIC DIAGNOSIS: ABNORMAL
Lab: ABNORMAL

## 2021-06-14 LAB
HPV HR 12 DNA SPEC QL NAA+PROBE: POSITIVE
HPV16 AG SPEC QL: NEGATIVE
HPV18 DNA SPEC QL NAA+PROBE: NEGATIVE

## 2021-06-28 ENCOUNTER — PROCEDURE VISIT (OUTPATIENT)
Dept: OBSTETRICS AND GYNECOLOGY | Facility: CLINIC | Age: 26
End: 2021-06-28
Payer: MEDICAID

## 2021-06-28 VITALS
WEIGHT: 150.81 LBS | BODY MASS INDEX: 27.75 KG/M2 | HEIGHT: 62 IN | SYSTOLIC BLOOD PRESSURE: 112 MMHG | DIASTOLIC BLOOD PRESSURE: 72 MMHG

## 2021-06-28 DIAGNOSIS — R87.612 LOW GRADE SQUAMOUS INTRAEPITHELIAL LESION ON CYTOLOGIC SMEAR OF CERVIX (LGSIL): Primary | ICD-10-CM

## 2021-06-28 PROBLEM — Z98.51 STATUS POST TUBAL LIGATION: Status: RESOLVED | Noted: 2018-07-02 | Resolved: 2021-06-28

## 2021-06-28 PROBLEM — O09.899 RUBELLA NON-IMMUNE STATUS, ANTEPARTUM: Status: RESOLVED | Noted: 2017-11-27 | Resolved: 2021-06-28

## 2021-06-28 PROBLEM — Z28.39 RUBELLA NON-IMMUNE STATUS, ANTEPARTUM: Status: RESOLVED | Noted: 2017-11-27 | Resolved: 2021-06-28

## 2021-06-28 PROBLEM — O99.013 ANEMIA DURING PREGNANCY IN THIRD TRIMESTER: Status: RESOLVED | Noted: 2018-04-04 | Resolved: 2021-06-28

## 2021-06-28 PROBLEM — B97.7 HIGH RISK HPV INFECTION: Status: RESOLVED | Noted: 2017-11-27 | Resolved: 2021-06-28

## 2021-06-28 PROCEDURE — 88305 TISSUE EXAM BY PATHOLOGIST: CPT | Mod: 26,,, | Performed by: PATHOLOGY

## 2021-06-28 PROCEDURE — 81025 URINE PREGNANCY TEST: CPT | Mod: PBBFAC | Performed by: NURSE PRACTITIONER

## 2021-06-28 PROCEDURE — 57454 COLPOSCOPY: ICD-10-PCS | Mod: S$PBB,,, | Performed by: NURSE PRACTITIONER

## 2021-06-28 PROCEDURE — 88305 TISSUE EXAM BY PATHOLOGIST: ICD-10-PCS | Mod: 26,,, | Performed by: PATHOLOGY

## 2021-06-28 PROCEDURE — 88305 TISSUE EXAM BY PATHOLOGIST: CPT | Performed by: PATHOLOGY

## 2021-06-28 PROCEDURE — 57454 BX/CURETT OF CERVIX W/SCOPE: CPT | Mod: S$PBB,,, | Performed by: NURSE PRACTITIONER

## 2021-06-28 PROCEDURE — 57454 BX/CURETT OF CERVIX W/SCOPE: CPT | Mod: PBBFAC | Performed by: NURSE PRACTITIONER

## 2021-07-01 LAB
FINAL PATHOLOGIC DIAGNOSIS: NORMAL
GROSS: NORMAL
Lab: NORMAL

## 2021-10-19 NOTE — TELEPHONE ENCOUNTER
Spoke to pharmacist and she will change prescription to a vitamin that is covered by her insurance.   Unknown if ever smoked

## 2022-12-18 ENCOUNTER — OFFICE VISIT (OUTPATIENT)
Dept: URGENT CARE | Facility: CLINIC | Age: 27
End: 2022-12-18
Payer: COMMERCIAL

## 2022-12-18 VITALS
HEIGHT: 62 IN | OXYGEN SATURATION: 96 % | TEMPERATURE: 99 F | WEIGHT: 150 LBS | BODY MASS INDEX: 27.6 KG/M2 | RESPIRATION RATE: 18 BRPM | DIASTOLIC BLOOD PRESSURE: 67 MMHG | HEART RATE: 62 BPM | SYSTOLIC BLOOD PRESSURE: 103 MMHG

## 2022-12-18 DIAGNOSIS — H10.31 ACUTE BACTERIAL CONJUNCTIVITIS OF RIGHT EYE: Primary | ICD-10-CM

## 2022-12-18 PROCEDURE — 3074F PR MOST RECENT SYSTOLIC BLOOD PRESSURE < 130 MM HG: ICD-10-PCS | Mod: CPTII,S$GLB,, | Performed by: PHYSICIAN ASSISTANT

## 2022-12-18 PROCEDURE — 1160F RVW MEDS BY RX/DR IN RCRD: CPT | Mod: CPTII,S$GLB,, | Performed by: PHYSICIAN ASSISTANT

## 2022-12-18 PROCEDURE — 3008F BODY MASS INDEX DOCD: CPT | Mod: CPTII,S$GLB,, | Performed by: PHYSICIAN ASSISTANT

## 2022-12-18 PROCEDURE — 1159F PR MEDICATION LIST DOCUMENTED IN MEDICAL RECORD: ICD-10-PCS | Mod: CPTII,S$GLB,, | Performed by: PHYSICIAN ASSISTANT

## 2022-12-18 PROCEDURE — 3008F PR BODY MASS INDEX (BMI) DOCUMENTED: ICD-10-PCS | Mod: CPTII,S$GLB,, | Performed by: PHYSICIAN ASSISTANT

## 2022-12-18 PROCEDURE — 3078F PR MOST RECENT DIASTOLIC BLOOD PRESSURE < 80 MM HG: ICD-10-PCS | Mod: CPTII,S$GLB,, | Performed by: PHYSICIAN ASSISTANT

## 2022-12-18 PROCEDURE — 1159F MED LIST DOCD IN RCRD: CPT | Mod: CPTII,S$GLB,, | Performed by: PHYSICIAN ASSISTANT

## 2022-12-18 PROCEDURE — 99214 PR OFFICE/OUTPT VISIT, EST, LEVL IV, 30-39 MIN: ICD-10-PCS | Mod: S$GLB,,, | Performed by: PHYSICIAN ASSISTANT

## 2022-12-18 PROCEDURE — 1160F PR REVIEW ALL MEDS BY PRESCRIBER/CLIN PHARMACIST DOCUMENTED: ICD-10-PCS | Mod: CPTII,S$GLB,, | Performed by: PHYSICIAN ASSISTANT

## 2022-12-18 PROCEDURE — 3074F SYST BP LT 130 MM HG: CPT | Mod: CPTII,S$GLB,, | Performed by: PHYSICIAN ASSISTANT

## 2022-12-18 PROCEDURE — 99214 OFFICE O/P EST MOD 30 MIN: CPT | Mod: S$GLB,,, | Performed by: PHYSICIAN ASSISTANT

## 2022-12-18 PROCEDURE — 3078F DIAST BP <80 MM HG: CPT | Mod: CPTII,S$GLB,, | Performed by: PHYSICIAN ASSISTANT

## 2022-12-18 RX ORDER — POLYMYXIN B SULFATE AND TRIMETHOPRIM 1; 10000 MG/ML; [USP'U]/ML
1 SOLUTION OPHTHALMIC EVERY 6 HOURS
Qty: 10 ML | Refills: 0 | Status: SHIPPED | OUTPATIENT
Start: 2022-12-18

## 2022-12-18 RX ORDER — ESCITALOPRAM OXALATE 20 MG/1
20 TABLET ORAL
COMMUNITY
Start: 2022-11-27

## 2022-12-18 NOTE — PROGRESS NOTES
"Subjective:       Patient ID: Mattie Bledsoe is a 27 y.o. female.    Vitals:  height is 5' 2" (1.575 m) and weight is 68 kg (150 lb). Her oral temperature is 99 °F (37.2 °C). Her blood pressure is 103/67 and her pulse is 62. Her respiration is 18 and oxygen saturation is 96%.     Chief Complaint: Conjunctivitis    Mattie Bledsoe is a 27 year old female who presents today with right eye redness and irritation.  Sx started last night.  Eye redness.  Little crusting this morning.  Getting over a cold for about a week.  No vision changes or blurriness.  No sick contacts.  States her cold symptoms are improving.    Conjunctivitis  This is a new problem. The current episode started yesterday. The problem occurs constantly. The problem has been unchanged. Pertinent negatives include no abdominal pain, anorexia, arthralgias, change in bowel habit, chest pain, chills, diaphoresis, fatigue, fever, headaches, joint swelling, myalgias, nausea, neck pain, numbness, rash, sore throat, swollen glands, urinary symptoms, vertigo, visual change, vomiting or weakness. Nothing aggravates the symptoms. She has tried nothing for the symptoms.     Constitution: Negative for chills, sweating, fatigue and fever.   HENT:  Negative for sore throat.    Neck: Negative for neck pain.   Cardiovascular:  Negative for chest pain.   Eyes:  Positive for eye discharge and eye redness. Negative for eye trauma, foreign body in eye, eye itching, eye pain, photophobia, vision loss, double vision, blurred vision and eyelid swelling.   Gastrointestinal:  Negative for abdominal pain, nausea and vomiting.   Musculoskeletal:  Negative for joint pain, joint swelling and muscle ache.   Skin:  Negative for rash.   Neurological:  Negative for history of vertigo, headaches and numbness.     Objective:      Physical Exam   Constitutional: She is oriented to person, place, and time. She appears well-developed.   HENT:   Head: Normocephalic and atraumatic. "   Ears:   Right Ear: External ear normal.   Left Ear: External ear normal.   Nose: Nose normal.   Mouth/Throat: Oropharynx is clear and moist.   Eyes: EOM and lids are normal. Pupils are equal, round, and reactive to light. Lids are everted and swept, no foreign bodies found. Right eye exhibits no exudate. Left eye exhibits no exudate. Right conjunctiva is injected. Left conjunctiva is not injected. Extraocular movement intact vision grossly intact   Neck: Trachea normal and phonation normal. Neck supple.   Musculoskeletal: Normal range of motion.         General: Normal range of motion.   Neurological: She is alert and oriented to person, place, and time.   Skin: Skin is warm, dry and intact.   Psychiatric: Her speech is normal and behavior is normal. Judgment and thought content normal.   Nursing note and vitals reviewed.      Assessment:       1. Acute bacterial conjunctivitis of right eye          Plan:         Acute bacterial conjunctivitis of right eye  -     polymyxin B sulf-trimethoprim (POLYTRIM) 10,000 unit- 1 mg/mL Drop; Place 1 drop into the right eye every 6 (six) hours.  Dispense: 10 mL; Refill: 0       Polytrim sent to pharmacy.  Advised patient to avoid touching of her eye and regular handwashing.  Advised that she is contagious for 24-48 hours after starting meds.  RTC or go to the ER with new or worsening symptoms.

## 2024-04-15 NOTE — PROGRESS NOTES
Pt is here for pre-operative visit.  Pt reports no complaints.  Ready for surgery.    ROS Negative     Physical Exam:  See H+P    A/P:  Encounter for sterilization  -     Procedure risks, benefits, and alternatives were discussed.  Increased risk of regret due to age < 26 yo was discussed.  Pt voiced understanding and expressed consent for LSC BTL.  Hospital forms were reviewed with pt and signed.  Pre-operative instructions were given.  
.

## (undated) DEVICE — TUBING HEATED INSUFFLATOR

## (undated) DEVICE — BLADE SURG CARBON STEEL SZ11

## (undated) DEVICE — GLOVE PROTEXIS HYDROGEL SZ7

## (undated) DEVICE — MANIFOLD 4 PORT

## (undated) DEVICE — GLOVE PROTEXIS HYDROGEL SZ6.5

## (undated) DEVICE — ADHESIVE DERMABOND ADVANCED

## (undated) DEVICE — SYR 3CC LUER LOC

## (undated) DEVICE — DRAPE LAVH LAPAROSCOPY W/FLUID

## (undated) DEVICE — SEE MEDLINE ITEM 157117

## (undated) DEVICE — GOWN SURG 2XL DISP TIE BACK

## (undated) DEVICE — TROCAR ENDOPATH XCEL 8MM 10CM

## (undated) DEVICE — SEE MEDLINE ITEM 154981

## (undated) DEVICE — KIT ANTIFOG

## (undated) DEVICE — ELECTRODE REM PLYHSV RETURN 9

## (undated) DEVICE — SUT VICRYL 4-0 ANTIBACT

## (undated) DEVICE — SYR 10CC LUER LOCK

## (undated) DEVICE — TROCAR ENDOPATH XCEL 5X100MM

## (undated) DEVICE — GLOVE PROTEXIS HYDROGEL SZ7.5

## (undated) DEVICE — SEE MEDLINE ITEM 152739

## (undated) DEVICE — NDL PNEUMO INSUFFLATI 120MM

## (undated) DEVICE — SEE MEDLINE ITEM 157027

## (undated) DEVICE — MANIPULATOR UTERINE

## (undated) DEVICE — CANNULA ENDOPATH XCEL 5X100MM

## (undated) DEVICE — COVER OVERHEAD SURG LT BLUE

## (undated) DEVICE — SEE MEDLINE ITEM 157181

## (undated) DEVICE — CORD LAP 10 DISP

## (undated) DEVICE — GLOVE 8 PROTEXIS PI BLUE

## (undated) DEVICE — SEAL SCOPE WARMER 20/BX